# Patient Record
Sex: FEMALE | Race: BLACK OR AFRICAN AMERICAN | Employment: OTHER | URBAN - METROPOLITAN AREA
[De-identification: names, ages, dates, MRNs, and addresses within clinical notes are randomized per-mention and may not be internally consistent; named-entity substitution may affect disease eponyms.]

---

## 2018-06-21 ENCOUNTER — OFFICE VISIT (OUTPATIENT)
Dept: FAMILY MEDICINE CLINIC | Age: 53
End: 2018-06-21

## 2018-06-21 VITALS
OXYGEN SATURATION: 96 % | TEMPERATURE: 98.5 F | WEIGHT: 196.2 LBS | SYSTOLIC BLOOD PRESSURE: 140 MMHG | HEIGHT: 66 IN | RESPIRATION RATE: 12 BRPM | BODY MASS INDEX: 31.53 KG/M2 | DIASTOLIC BLOOD PRESSURE: 98 MMHG | HEART RATE: 83 BPM

## 2018-06-21 DIAGNOSIS — R10.2 PELVIC PAIN: ICD-10-CM

## 2018-06-21 DIAGNOSIS — D25.9 UTERINE LEIOMYOMA, UNSPECIFIED LOCATION: Primary | ICD-10-CM

## 2018-06-21 DIAGNOSIS — R03.0 ELEVATED BLOOD PRESSURE READING: ICD-10-CM

## 2018-06-21 DIAGNOSIS — Z86.000 HISTORY OF DUCTAL CARCINOMA IN SITU (DCIS) OF BREAST: ICD-10-CM

## 2018-06-21 PROBLEM — E21.3 HYPERPARATHYROIDISM (HCC): Status: ACTIVE | Noted: 2018-06-21

## 2018-06-21 NOTE — PATIENT INSTRUCTIONS

## 2018-06-21 NOTE — PROGRESS NOTES
Patient: Jaiden Meyer MRN: [id number]  SSN: [id number]    YOB: 1965  Age: 47 y.o. Sex: female      Date of Service: 6/21/2018   Provider: GURMEET Quintero   Office Location:   [address] [place] [address] Dr Kevni marin, [address]  Office Phone: [phone]  Office Fax: [phone]        REASON FOR VISIT:   Chief Complaint   Patient presents with    Establish Care    Migraine    GERD    Other     Breast Cancer    Other     Uterine Fibroids        VITALS:   Visit Vitals    BP (!) 140/98    Pulse 83    Temp 98.5 °F (36.9 °C) (Oral)    Resp 12    Ht 5' 6\" (1.676 m)  Comment: patient reported    Wt 196 lb 3.2 oz (89 kg)    LMP 06/17/2018    SpO2 96%    BMI 31.67 kg/m2       MEDICATIONS:   Current Outpatient Prescriptions   Medication Sig Dispense Refill    loratadine (CLARITIN) 10 mg tablet Take 1 tablet by mouth daily. 90 tablet 3    esomeprazole (NEXIUM) 40 mg capsule Take 1 capsule by mouth daily.  90 capsule 1        ALLERGIES:   No Known Allergies     MEDICAL/SURGICAL HISTORY:  Past Medical History:   Diagnosis Date    Allergic rhinitis     Contact dermatitis and other eczema, due to unspecified cause     DCIS (ductal carcinoma in situ) of breast 12/06    Fibroids     Fibroids, uterine     Hematuria 12/2013    saw Dr. Chao Lux Lung nodule     Microscopic hematuria     Migraines     Prediabetes     Recurrent UTI 12/2013    saw Dr. Mumtaz Otero, macrodantin per notes as needed    TIA (transient ischemic attack) 2012      Past Surgical History:   Procedure Laterality Date    HX BREAST RECONSTRUCTION  1/11    Bilateral    HX MASTECTOMY  3/09    Bilateral    MYOMECTOMY 1-4,W/TOT 250GMS/<,ABD APPRCH  8/05        FAMILY HISTORY:  Family History   Problem Relation Age of Onset    No Known Problems Mother     Kidney Disease Father         SOCIAL HISTORY:  Social History   Substance Use Topics    Smoking status: Never Smoker    Smokeless tobacco: Never Used    Alcohol use No            HISTORY OF PRESENT ILLNESS:   Cathi Barnett is a 47 y.o. female who presents to the office to re-establish care. She was last seen in this office in 2014 by Dr. Bibi Russell. She has been following with a PCP at the [place] in the meantime, and will continue to receive the bulk of her primary care there. Breast Cancer History -   Patient reports she was diagnosed with DCIS of the R breast in 2007. She underwent 2 lumpectomies and ultimately a b/l mastectomy in 2008. She has since been following with the breast clinic at [facility] (Dr. Little Luna and Dr. Manju Nguyen) but has not been seen since 2016. She is requesting a referral to return for surveillance. She reports she no longer needs mammograms, but they do serial exams and sometimes ultrasounds. Denies any acute issues. Hyperparathyroidism -   Incidentally noted on some routine labs done by PCP at the [place]. She is now followed annually by endocrinology to have her PTH levels checked. She reports everything has been stable, and that surgery will be a last resort. She had a DEXA scan within the past few months which she believes was normal.    History of TIA -   Noted in patient's record. She reports she'd had some issues with slurred speech and facial drooping and it was initially thought to be a stroke but MRI was negative. She forgets exactly what the diagnosis was but will check her records. She has had no recurrent or residual symptoms. BP is noted to be high today on intake     Pelvic Pain -   Patient's main reason for visit today is for a referral to ob/gyn. She has known fibroids in her uterus and is s/p myomectomy in 2005. She reports the fibroids have returned and are causing her some pelvic discomfort, especially deep dyspareunia with intercourse. She saw an ob/gyn at the [place] and was told that she could not have surgery as she was not post-menopausal. She is requesting a second opinion.        Health Maintenance - Previous PCP - Had previously been seeing Dr. Evelyn Guzman and Dr. Tasha Victoria in this office, now predominantly followed by DANI Whitney at the [place]   Last PE with routine labs - last routine exam at the [place] was a few months ago. Reports labs were normal.   Last pap smear - 11/2015, normal per patient, states she will be due this November. Paps have always been normal  Last mammogram - N/A due to b/l mastectomy see above   Last DEXA scan - last month, normal per patient   Last colonoscopy - never. Has FIT test kit at home ordered by her [place] provider. She intends to do this in the next month. Immunizations - UTD per patient, but appears to be due for Tdap booster     REVIEW OF SYSTEMS:  ROS (see scanned H&P form for complete 12 point ROS)     PHYSICAL EXAMINATION:  Physical Exam   Constitutional: She is oriented to person, place, and time and well-developed, well-nourished, and in no distress. Cardiovascular: Normal rate, regular rhythm and normal heart sounds. Exam reveals no gallop and no friction rub. No murmur heard. Pulmonary/Chest: Effort normal and breath sounds normal. She has no wheezes. She has no rales. Neurological: She is alert and oriented to person, place, and time. Gait normal.   Skin: Skin is warm and dry. No rash noted. Psychiatric: Mood, memory and affect normal.        RESULTS:  No results found for this visit on 06/21/18. ASSESSMENT/PLAN:  Diagnoses and all orders for this visit:    1. Uterine leiomyoma, unspecified location   2. Pelvic pain  - Patient requesting second ob/gyn opinion for possible hysterectomy. Referral ordered today. - Advised her to obtain a copy of her most recent pelvic ultrasound report from the [place] to take to that appointment  Orders:    -     [facility]    3.  History of ductal carcinoma in situ (DCIS) of breast  - Will refer back to the [facility] breast clinic for continued surveillance  Orders:    -     REFERRAL TO BREAST SURGERY    4. Elevated blood pressure reading  - Single isolated reading. Patient reports no prior history of HTN and hast not had high blood pressure at other recent doctor's appts. - Encouraged her to obtain a home BP monitor and check periodically with goal being <140/90  - Encouraged low sodium diet, regular exercise  - Will bring her back in a few months for reassessment and start meds if still elevated     5. BMI 31.0-31.9,adult  - Healthy lifestyle handout included in AVS   - DASH diet for high BP         Follow-up Disposition:  Return in about 3 months (around 9/21/2018) for Routine Care.        PATIENT CARE TEAM:   Patient Care Team:  Kiersten Rodrigues MD as PCP - General (Family Practice)  Juli [place] MD (Urology)  Stevenson Maguire MD (Gynecology)       [place], [place]   June 21, 2018    10:36 AM

## 2018-06-21 NOTE — MR AVS SNAPSHOT
[address] [place] [address] 
[phone] Patient: Wilmer Browning MRN: [id number] :1965 Visit Information Date & Time Provider Department Dept. Phone Encounter #  
 2018  9:00 AM Rachana Mathis, [address] [id number] Follow-up Instructions Return in about 3 months (around 2018) for Routine Care. Upcoming Health Maintenance Date Due Pneumococcal 19-64 Highest Risk (1 of 3 - PCV13) 10/6/1985 DTaP/Tdap/Td series (1 - Tdap) 2005 PAP AKA CERVICAL CYTOLOGY 2/10/2015 FOBT Q 1 YEAR AGE 50-75 10/6/2016 Influenza Age 5 to Adult 2018 Allergies as of 2018  Review Complete On: 2018 By: GURMEET Arvizu No Known Allergies Current Immunizations  Reviewed on 3/21/2012 Name Date  
 TD Vaccine 2005 Not reviewed this visit You Were Diagnosed With   
  
 Codes Comments Uterine leiomyoma, unspecified location    -  Primary ICD-10-CM: D25.9 ICD-9-CM: 218.9 Pelvic pain     ICD-10-CM: R10.2 ICD-9-CM: [id number] History of ductal carcinoma in situ (DCIS) of breast     ICD-10-CM: Z86.000 ICD-9-CM: V13.89 Elevated blood pressure reading     ICD-10-CM: R03.0 ICD-9-CM: 796.2 Vitals BP Pulse Temp Resp Height(growth percentile) Weight(growth percentile) (!) 140/98 83 98.5 °F (36.9 °C) (Oral) 12 5' 6\" (1.676 m) 196 lb 3.2 oz (89 kg) LMP SpO2 BMI OB Status Smoking Status 2018 96% 31.67 kg/m2 Premenopausal Never Smoker Vitals History BMI and BSA Data Body Mass Index Body Surface Area  
 31.67 kg/m 2 2.04 m 2 Your Updated Medication List  
  
   
This list is accurate as of 18 10:15 AM.  Always use your most recent med list.  
  
  
  
  
 esomeprazole 40 mg capsule Commonly known as:  NexIUM Take 1 capsule by mouth daily. loratadine 10 mg tablet Commonly known as:  Mery Punches Take 1 tablet by mouth daily. We Performed the Following REFERRAL TO BREAST SURGERY [REF10 Custom] REFERRAL TO OBSTETRICS AND GYNECOLOGY [REF51 Custom] Follow-up Instructions Return in about 3 months (around 9/21/2018) for Routine Care. Referral Information Referral ID Referred By Referred To  
  
 [id number] Ines,  [address] Ivanna Frank [place], [facility] [address] Phone: [phone] Fax: [phone] Visits Status Start Date End Date 1 New Request 6/21/18 6/21/19 If your referral has a status of pending review or denied, additional information will be sent to support the outcome of this decision. Referral ID Referred By Referred To  
 [id number] Tawanna HAMILTON Not Available Visits Status Start Date End Date 1 New Request 6/21/18 6/21/19 If your referral has a status of pending review or denied, additional information will be sent to support the outcome of this decision. Patient Instructions A Healthy Lifestyle: Care Instructions Your Care Instructions A healthy lifestyle can help you feel good, stay at a healthy weight, and have plenty of energy for both work and play. A healthy lifestyle is something you can share with your whole family. A healthy lifestyle also can lower your risk for serious health problems, such as high blood pressure, heart disease, and diabetes. You can follow a few steps listed below to improve your health and the health of your family. Follow-up care is a key part of your treatment and safety. Be sure to make and go to all appointments, and call your doctor if you are having problems. It's also a good idea to know your test results and keep a list of the medicines you take. How can you care for yourself at home? · Do not eat too much sugar, fat, or fast foods.  You can still have dessert and treats now and then. The goal is moderation. · Start small to improve your eating habits. Pay attention to portion sizes, drink less juice and soda pop, and eat more fruits and vegetables. ¨ Eat a healthy amount of food. A 3-ounce serving of meat, for example, is about the size of a deck of cards. Fill the rest of your plate with vegetables and whole grains. ¨ Limit the amount of soda and sports drinks you have every day. Drink more water when you are thirsty. ¨ Eat at least 5 servings of fruits and vegetables every day. It may seem like a lot, but it is not hard to reach this goal. A serving or helping is 1 piece of fruit, 1 cup of vegetables, or 2 cups of leafy, raw vegetables. Have an apple or some carrot sticks as an afternoon snack instead of a candy bar. Try to have fruits and/or vegetables at every meal. 
· Make exercise part of your daily routine. You may want to start with simple activities, such as walking, bicycling, or slow swimming. Try to be active 30 to 60 minutes every day. You do not need to do all 30 to 60 minutes all at once. For example, you can exercise 3 times a day for 10 or 20 minutes. Moderate exercise is safe for most people, but it is always a good idea to talk to your doctor before starting an exercise program. 
· Keep moving. Scotty Charlene the lawn, work in the garden, or Sunible. Take the stairs instead of the elevator at work. · If you smoke, quit. People who smoke have an increased risk for heart attack, stroke, cancer, and other lung illnesses. Quitting is hard, but there are ways to boost your chance of quitting tobacco for good. ¨ Use nicotine gum, patches, or lozenges. ¨ Ask your doctor about stop-smoking programs and medicines. ¨ Keep trying.  
In addition to reducing your risk of diseases in the future, you will notice some benefits soon after you stop using tobacco. If you have shortness of breath or asthma symptoms, they will likely get better within a few weeks after you quit. · Limit how much alcohol you drink. Moderate amounts of alcohol (up to 2 drinks a day for men, 1 drink a day for women) are okay. But drinking too much can lead to liver problems, high blood pressure, and other health problems. Family health If you have a family, there are many things you can do together to improve your health. · Eat meals together as a family as often as possible. · Eat healthy foods. This includes fruits, vegetables, lean meats and dairy, and whole grains. · Include your family in your fitness plan. Most people think of activities such as jogging or tennis as the way to fitness, but there are many ways you and your family can be more active. Anything that makes you breathe hard and gets your heart pumping is exercise. Here are some tips: 
¨ Walk to do errands or to take your child to school or the bus. ¨ Go for a family bike ride after dinner instead of watching TV. Where can you learn more? Go to http://anahy-amy.info/. Enter D240 in the search box to learn more about \"A Healthy Lifestyle: Care Instructions. \" Current as of: May 12, 2017 Content Version: 11.4 © 4249-0616 Peela. Care instructions adapted under license by [employer] (which disclaims liability or warranty for this information). If you have questions about a medical condition or this instruction, always ask your healthcare professional. [place] [address] any warranty or liability for your use of this information. Introducing [facility]! Dear Ruby Monreal: Thank you for requesting a Qapital account. Our records indicate that you already have an active Qapital account. You can access your account anytime at [email or web address] Did you know that you can access your hospital and ER discharge instructions at any time in Qapital?   You can also review all of your test results from your hospital stay or ER visit. Additional Information If you have questions, please visit the Frequently Asked Questions section of the NEX[employer]ID website at [email or web address] Remember, [employer] is NOT to be used for urgent needs. For medical emergencies, dial 911. Now available from your iPhone and Android! Please provide this summary of care documentation to your next provider. Your primary care clinician is listed as Cody Hernández. If you have any questions after today's visit, please call [phone].

## 2018-06-21 NOTE — PROGRESS NOTES
Chief Complaint   Patient presents with    Establish Care    Migraine    GERD    Other     Breast Cancer    Other     Uterine Fibroids     Visit Vitals    /86 (BP 1 Location: Left arm, BP Patient Position: Sitting)    Pulse 83    Temp 98.5 °F (36.9 °C) (Oral)    Resp 12    Ht 5' 6\" (1.676 m)    Wt 196 lb 3.2 oz (89 kg)    SpO2 96%    BMI 31.67 kg/m2     Patient is not fasting. Patient in room # 5.     1. Have you been to the ER, urgent care clinic since your last visit? Hospitalized since your last visit? No    2. Have you seen or consulted any other health care providers outside of the [address] [place] [address] since your last visit? Include any pap smears or colon screening. Yes When: 02/2018 Where: [facility] Reason for visit: 6 month check-up, 3/2018, [facility], Hyperparathyroid.  Reviewed. Pap in 2016 at [facility]. Mammogram patient declines. FIt test due, ordered by [facility] patient to completed by 07/2018. Flowsheet, Abuse, Learning needs and PHQ completed.

## 2018-07-25 ENCOUNTER — TELEPHONE (OUTPATIENT)
Dept: FAMILY MEDICINE CLINIC | Age: 53
End: 2018-07-25

## 2018-07-25 NOTE — TELEPHONE ENCOUNTER
Called home number. Verified name and . Spoke with patient. Patient notified of following up on referrals by Juan Antonio Draper and would like to know if she had appointment. Notified of notice from insurance of sending her to [facility]. Per patient has not been able to see providers as she has been in and out town, ongoing. Per patient she has the information from [employer] as well for [facility]. Per patient she will contact them today as planned to schedule appointments. Per patient had a visit to [facility] for  6 month follow-up with Carey Liao NP and blood pressure was fine at that visit. Patient was notified to contact office if she needs anything or have any further questions or concerns. Patient understood the reason for call and had no further questions.

## 2019-05-01 ENCOUNTER — HOSPITAL ENCOUNTER (OUTPATIENT)
Dept: PHYSICAL THERAPY | Age: 54
Discharge: HOME | End: 2019-05-01
Payer: COMMERCIAL

## 2019-05-01 PROCEDURE — 97162 PT EVAL MOD COMPLEX 30 MIN: CPT | Performed by: PHYSICAL THERAPIST

## 2019-05-01 PROCEDURE — 97112 NEUROMUSCULAR REEDUCATION: CPT | Performed by: PHYSICAL THERAPIST

## 2019-05-01 PROCEDURE — 97140 MANUAL THERAPY 1/> REGIONS: CPT | Performed by: PHYSICAL THERAPIST

## 2019-05-01 NOTE — PROGRESS NOTES
PT DAILY TREATMENT NOTE 10-18 Patient Name: Adrien Barraza Date:2019 : 1965 [x]  Patient  Verified Payor: Morris Dean / Plan: Kane Santos / Product Type: JibJab / In time:800  Out time:843 Total Treatment Time (min): 43 Visit #: 1 of 12 Medicare/BCBS Only Total Timed Codes (min):   1:1 Treatment Time:    
 
 
Treatment Area: Low back pain [M54.5] SUBJECTIVE Pain Level (0-10 scale): 5/10 Any medication changes, allergies to medications, adverse drug reactions, diagnosis change, or new procedure performed?: [x] No    [] Yes (see summary sheet for update) Subjective functional status/changes:   [] No changes reported HPI: Pt is a 46 y/o female w/ c/o increased pain in the center of the low back w/ a grinding sensation that has been on and off since  w/ recent flare up within the last year. Pt denies known EM for pain but reports it began after having her daughter. States she notices that the pain worsens when she has a flare up of her fibroids and with heavier activity as well as prolonged standing. Pain improved w/ a medication regimen that included motrin and methocarbamol as well as sitting in specific positions. Pt reports she has not had recent imaging but that she remembers she was told she has degeneration and arthritis in her back. Pt is retired and denies n/t into her legs. OBJECTIVE Modality rationale:  to improve the patients ability to Min Type Additional Details  
 [] Estim:  []Unatt       []IFC  []Premod []Other:  []w/ice   []w/heat Position: Location:  
 [] Estim: []Att    []TENS instruct  []NMES []Other:  []w/US   []w/ice   []w/heat Position: Location:  
 []  Traction: [] Cervical       []Lumbar 
                     [] Prone          []Supine []Intermittent   []Continuous Lbs: 
[] before manual 
[] after manual  
 []  Ultrasound: []Continuous   [] Pulsed []1MHz   []3MHz W/cm2: 
Location:  
 []  Iontophoresis with dexamethasone Location: [] Take home patch  
[] In clinic  
 []  Ice     []  heat 
[]  Ice massage 
[]  Laser  
[]  Anodyne Position: Location:  
 []  Laser with stim 
[]  Other:  Position: Location:  
 []  Vasopneumatic Device Pressure:       [] lo [] med [] hi  
Temperature: [] lo [] med [] hi  
[] Skin assessment post-treatment:  []intact []redness- no adverse reaction 
  []redness  adverse reaction:  
 
10 min [x]Eval                  []Re-Eval  
 
 
 min Therapeutic Exercise:  [] See flow sheet :  
Rationale:  to improve the patients ability to  
 
 min Therapeutic Activity:  []  See flow sheet :  
Rationale:   to improve the patients ability to  
  
23 min Neuromuscular Re-education:  [x]  See flow sheet : instructed in, demo'd, and performed HEP, educated in mechanics, core activation and components, and goals/progression of PT Rationale: increase strength, improve coordination and increase proprioception  to improve the patients ability to decrease pain and improve activity tolerance 10 min Manual Therapy:  MET for left post rotation, shotgun to set, TPR/DTM to left piriformis and B hip flexor stretch in prone Rationale: decrease pain, increase ROM, increase tissue extensibility and decrease trigger points to improve activity tolerance and function 
 
 min Gait Training:  ___ feet with ___ device on level surfaces with ___ level of assist  
Rationale: With 
 [] TE 
 [] TA 
 [] neuro 
 [] other: Patient Education: [x] Review HEP [] Progressed/Changed HEP based on:  
[] positioning   [] body mechanics   [] transfers   [] heat/ice application   
[] other:   
 
Other Objective/Functional Measures: Pt presents w/ l/s AROM WNL w/ slightly decreased lumbar reversal into FF, reports of pulling in opposite side w/ B AB.  MMT hip flex right 5/5, left 4/5, abd right 4+/5, left 4/5, ext right 4/5, left -4/5, knee flex B 5/5, ext B 5/5. Core strength measured w/ supine bridge is -3/5. Noted left posterior rotation at the innominate that corrected w/ MET. Increased myofascial restriction noted in the lumbar, glute, and lower abdominal regions. (-) neural tension tests, pain reported to decrease w/ B LE distraction, (-) flexibility testing. No TTP reported. Pain Level (0-10 scale) post treatment: 4/10 ASSESSMENT/Changes in Function: Pt w/ good response to manual therapy and improved pain following session. Educated in depth the components that make up the core and correct performance of kegel along w/ PPT to fully activate the core. Pt will benefit from skilled PT to address the deficits and progress with pt goals. Patient will continue to benefit from skilled PT services to modify and progress therapeutic interventions, address functional mobility deficits, address strength deficits, analyze and address soft tissue restrictions, analyze and cue movement patterns, analyze and modify body mechanics/ergonomics and instruct in home and community integration to attain remaining goals. [x]  See Plan of Care 
[]  See progress note/recertification 
[]  See Discharge Summary Progress towards goals / Updated goals: 
Short Term Goals: To be accomplished in 2 weeks: 1. Pt will be (I) and complaint w/ HEP to progress gains in PT.  
  
Long Term Goals: To be accomplished in 6 weeks: 1. Pt will improve FOTO to > or = to 66 to demo improved function. 2. Pt will improve core strength to > or = to 4/5 for ease w/ prolonged standing to cook dinner. 3. Pt will left hip MMT to > or = to 4+/5 for ease w/ performing heavier activity around her house. 4. Pt will report > or = to 60% overall improvement in symptoms for ease w/ return to unlimited ADLs. PLAN [x]  Upgrade activities as tolerated     [x]  Continue plan of care 
[]  Update interventions per flow sheet []  Discharge due to:_ 
[]  Other:_   
 
Jaspal Shukla, PT 5/1/2019  8:53 AM 
 
Future Appointments Date Time Provider Eliana Christian 5/7/2019  3:00 PM Patirajwinder Mastrenton B, PT [facility]  
5/8/2019  8:30 AM Jenni White, PT [facility]  
5/14/2019 10:30 AM Patience Mash B, PT [facility]  
5/16/2019 10:30 AM Patience Mash B, PT [facility]  
5/20/2019 10:30 AM Patience Mash B, PT [facility]  
5/21/2019 10:30 AM Patience Mash B, PT [facility]  
5/28/2019 10:30 AM Patience Mash B, PT [facility]  
5/30/2019 10:00 AM Patience Mash B, PT [facility]  
6/3/2019  2:30 PM Jenni White, PT [facility]  
6/5/2019 10:00 AM Jenni White, PT [facility]  
6/11/2019 10:30 AM Regino Hathaway, PT [facility]  
7/16/2019  8:00 AM Ramiro Ventura, GURMEET RODRIGUEZ SCHED

## 2019-05-01 NOTE — PROGRESS NOTES
In [address]  Fili Polk [address] [place], [address] 
[phone] fax Plan of Care/ Statement of Necessity for Physical Therapy Services Patient name: Tootie Delatorre Start of Care: 2019 Referral source: Tay Guadalupe NP : 1965 Medical Diagnosis: Low back pain [M54.5] Payor: [place] / Plan: [employer] / Product Type: [employer] Programs /  Onset RFLY:[id number] w/ flare up in the last year Treatment Diagnosis: LBP w/ pelvic obliquities Prior Hospitalization: see medical history Provider#: [id number] Medications: Verified on Patient summary List  
 Comorbidities: allergies, back pain, BMI > 30, CA, osteoporosis, prior surgery Prior Level of Function: Hx of intermittent back pain limiting heavy household activity and prolonged standing, retired The Plan of Care and following information is based on the information from the initial evaluation. Assessment/ key information: Pt is a 46 y/o female w/ c/o increased pain in the center of the low back w/ a grinding sensation that has been on and off since  w/ recent flare up within the last year. Pt denies known EM for pain but reports it began after having her daughter. States she notices that the pain worsens when she has a flare up of her fibroids and with heavier activity as well as prolonged standing. Pain improved w/ a medication regimen that included motrin and methocarbamol as well as sitting in specific positions. Pt reports she has not had recent imaging but that she remembers she was told she has degeneration and arthritis in her back. Pt is retired and denies n/t into her legs. Pt presents w/ l/s AROM WNL w/ slightly decreased lumbar reversal into FF, reports of pulling in opposite side w/ B AB. MMT hip flex right 5/5, left 4/5, abd right 4+/5, left 4/5, ext right 4/5, left -4/5, knee flex B 5/5, ext B 5/5.  Core strength measured w/ supine bridge is -3/5. Noted left posterior rotation at the innominate that corrected w/ MET. Increased myofascial restriction noted in the lumbar, glute, and lower abdominal regions. (-) neural tension tests, pain reported to decrease w/ B LE distraction, (-) flexibility testing. No TTP reported. Patient will benefit from skilled PT services to modify and progress therapeutic interventions, address functional mobility deficits, address strength deficits, analyze and address soft tissue restrictions, analyze and cue movement patterns, analyze and modify body mechanics/ergonomics and instruct in home and community integration to attain remaining goals. Evaluation Complexity History MEDIUM  Complexity : 1-2 comorbidities / personal factors will impact the outcome/ POC ; Examination MEDIUM Complexity : 3 Standardized tests and measures addressing body structure, function, activity limitation and / or participation in recreation  ;Presentation MEDIUM Complexity : Evolving with changing characteristics  ; Clinical Decision Making MEDIUM Complexity : FOTO score of 26-74 Overall Complexity Rating: MEDIUM Problem List: pain affecting function, decrease strength, decrease ADL/ functional abilitiies and decrease activity tolerance Treatment Plan may include any combination of the following: Therapeutic exercise, Therapeutic activities, Neuromuscular re-education, Physical agent/modality, Manual therapy and Patient education Patient / Family readiness to learn indicated by: asking questions, trying to perform skills and interest 
Persons(s) to be included in education: patient (P) Barriers to Learning/Limitations: None Patient Goal (s): I hope to accomplish a pain free life or a way to manage outside of physical therapy Patient Self Reported Health Status: good Rehabilitation Potential: good Short Term Goals: To be accomplished in 2 weeks: 1. Pt will be (I) and complaint w/ HEP to progress gains in PT. Long Term Goals: To be accomplished in 6 weeks: 1. Pt will improve FOTO to > or = to 66 to demo improved function. 2. Pt will improve core strength to > or = to 4/5 for ease w/ prolonged standing to cook dinner. 3. Pt will left hip MMT to > or = to 4+/5 for ease w/ performing heavier activity around her house. 4. Pt will report > or = to 60% overall improvement in symptoms for ease w/ return to unlimited ADLs. Frequency / Duration: Patient to be seen 2 times per week for 6 weeks. Patient/ Caregiver education and instruction: Diagnosis, prognosis, self care, activity modification and exercises 
 [x]  Plan of care has been reviewed with KRISTI Eisenberg, PT 5/1/2019 12:42 PM 
 
________________________________________________________________________ I certify that the above Therapy Services are being furnished while the patient is under my care. I agree with the treatment plan and certify that this therapy is necessary. [de-identified] Signature:____________Date:_________TIME:________ 
 
[employer], Date and Time must be completed for valid certification ** Please sign and return to In [address] [place], [address] 
[phone] fax

## 2019-05-07 ENCOUNTER — HOSPITAL ENCOUNTER (OUTPATIENT)
Dept: PHYSICAL THERAPY | Age: 54
Discharge: HOME | End: 2019-05-07
Payer: COMMERCIAL

## 2019-05-07 PROCEDURE — 97140 MANUAL THERAPY 1/> REGIONS: CPT

## 2019-05-07 PROCEDURE — 97110 THERAPEUTIC EXERCISES: CPT

## 2019-05-07 PROCEDURE — 97112 NEUROMUSCULAR REEDUCATION: CPT

## 2019-05-07 NOTE — PROGRESS NOTES
PT DAILY TREATMENT NOTE 10-18 Patient Name: Adrien Barraza Date:2019 : 1965 [x]  Patient  Verified Payor: Morris Dean / Plan: Kane Santos / Product Type: [employer] / In time:300  Out time:349 Total Treatment Time (min): 49 Visit #: 2 of 12 Medicare/BCBS Only Total Timed Codes (min):   1:1 Treatment Time:    
 
 
Treatment Area: Low back pain [M54.5] SUBJECTIVE Pain Level (0-10 scale): 5 Any medication changes, allergies to medications, adverse drug reactions, diagnosis change, or new procedure performed?: [x] No    [] Yes (see summary sheet for update) Subjective functional status/changes:   [] No changes reported I felt good after I left last time. I actually could do more things at home. OBJECTIVE Modality rationale: decrease pain to improve the patients ability to tolerate post needle soreness Min Type Additional Details  
 [] Estim:  []Unatt       []IFC  []Premod []Other:  []w/ice   []w/heat Position: Location:  
 [] Estim: []Att    []TENS instruct  []NMES []Other:  []w/US   []w/ice   []w/heat Position: Location:  
 []  Traction: [] Cervical       []Lumbar 
                     [] Prone          []Supine []Intermittent   []Continuous Lbs: 
[] before manual 
[] after manual  
 []  Ultrasound: []Continuous   [] Pulsed []1MHz   []3MHz W/cm2: 
Location:  
 []  Iontophoresis with dexamethasone Location: [] Take home patch  
[] In clinic  
10 []  Ice     [x]  heat 
[]  Ice massage 
[]  Laser  
[]  Anodyne Position:supine Location:L/S  
 []  Laser with stim 
[]  Other:  Position: Location:  
 []  Vasopneumatic Device Pressure:       [] lo [] med [] hi  
Temperature: [] lo [] med [] hi  
[] Skin assessment post-treatment:  []intact []redness- no adverse reaction 
  []redness  adverse reaction: 21 min Therapeutic Exercise:  [] See flow sheet :  
Rationale: increase ROM and increase strength to improve the patients ability to perform ADLs 8 min Neuromuscular Re-education:  []  See flow sheet :  
Rationale: increase strength  to improve the patients ability to recruit TA for daily activities 10 min Manual Therapy:  Per flow sheet Rationale: decrease pain, increase ROM and increase tissue extensibility to improve glute recruitment With 
 [] TE 
 [] TA 
 [] neuro 
 [] other: Patient Education: [x] Review HEP [] Progressed/Changed HEP based on:  
[] positioning   [] body mechanics   [] transfers   [] heat/ice application   
[] other:   
 
Other Objective/Functional Measures:   
 
Pain Level (0-10 scale) post treatment: 4 
 
ASSESSMENT/Changes in Function: Poor spinal mobility noted; added segmental bridging and typewriter next visit. Patient will continue to benefit from skilled PT services to modify and progress therapeutic interventions, address functional mobility deficits, address ROM deficits, address strength deficits, analyze and address soft tissue restrictions, analyze and cue movement patterns and assess and modify postural abnormalities to attain remaining goals. []  See Plan of Care 
[]  See progress note/recertification 
[]  See Discharge Summary Progress towards goals / Updated goals: 
  
Short Term Goals: To be accomplished in 2 weeks: 1. Pt will be (I) and complaint w/ HEP to progress gains in PT. goal met5/7/19 
  
Long Term Goals: To be accomplished in 6 weeks: 1. Pt will improve FOTO to > or = to 66 to demo improved function. 2. Pt will improve core strength to > or = to 4/5 for ease w/ prolonged standing to cook dinner. 3. Pt will left hip MMT to > or = to 4+/5 for ease w/ performing heavier activity around her house. 4. Pt will report > or = to 60% overall improvement in symptoms for ease w/ return to unlimited ADLs.   
 
 
 
PLAN 
 []  Upgrade activities as tolerated     [x]  Continue plan of care 
[]  Update interventions per flow sheet      
[]  Discharge due to:_ 
[]  Other:_ Mercedes Luz, PT 5/7/2019  3:52 PM 
 
Future Appointments Date Time Provider Eliana Christian 5/8/2019  8:30 AM Fritz July, PT [facility]  
5/14/2019 10:30 AM Faith Legions B, PT [facility]  
5/16/2019 10:30 AM Faith Legions B, PT [facility]  
5/20/2019 10:30 AM Faith Legions B, PT [facility]  
5/21/2019 10:30 AM Faith Legions B, PT [facility]  
5/28/2019 10:30 AM Faith Legions B, PT [facility]  
5/30/2019 10:00 AM Fatih Legions B, PT [facility]  
6/3/2019  2:30 PM Fritz July, PT [facility]  
6/5/2019 10:00 AM Fritz July, PT [facility]  
6/11/2019 10:30 AM Urban Bologna, PT [facility]  
7/16/2019  8:00 AM Zak Ventura, PA [facility] SCHED

## 2019-05-08 ENCOUNTER — HOSPITAL ENCOUNTER (OUTPATIENT)
Dept: PHYSICAL THERAPY | Age: 54
Discharge: HOME | End: 2019-05-08
Payer: COMMERCIAL

## 2019-05-08 PROCEDURE — 97112 NEUROMUSCULAR REEDUCATION: CPT | Performed by: PHYSICAL THERAPIST

## 2019-05-08 PROCEDURE — 97110 THERAPEUTIC EXERCISES: CPT | Performed by: PHYSICAL THERAPIST

## 2019-05-08 PROCEDURE — 97140 MANUAL THERAPY 1/> REGIONS: CPT | Performed by: PHYSICAL THERAPIST

## 2019-05-08 NOTE — PROGRESS NOTES
PT DAILY TREATMENT NOTE 10-18 Patient Name: Rula Watt Date:2019 : 1965 [x]  Patient  Verified Payor: Ananya Espinoza / Plan: Nathalia Goldstein / Product Type: [employer] / In time:830  Out time:921 Total Treatment Time (min): 51 Visit #: 3 of 12 Medicare/BCBS Only Total Timed Codes (min):   1:1 Treatment Time:    
 
 
Treatment Area: Low back pain [M54.5] SUBJECTIVE Pain Level (0-10 scale): 10 Any medication changes, allergies to medications, adverse drug reactions, diagnosis change, or new procedure performed?: [x] No    [] Yes (see summary sheet for update) Subjective functional status/changes:   [] No changes reported Pt reports she is really sore after last visit but her pain is hardly noticeable OBJECTIVE Modality rationale: decrease pain and increase muscle contraction/control to improve the patients ability to decrease soreness and improve activity tolerance Min Type Additional Details  
 [] Estim:  []Unatt       []IFC  []Premod []Other:  []w/ice   []w/heat Position: Location:  
 [] Estim: []Att    []TENS instruct  []NMES []Other:  []w/US   []w/ice   []w/heat Position: Location:  
 []  Traction: [] Cervical       []Lumbar 
                     [] Prone          []Supine []Intermittent   []Continuous Lbs: 
[] before manual 
[] after manual  
 []  Ultrasound: []Continuous   [] Pulsed []1MHz   []3MHz W/cm2: 
Location:  
 []  Iontophoresis with dexamethasone Location: [] Take home patch  
[] In clinic  
10 []  Ice     [x]  heat 
[]  Ice massage 
[]  Laser  
[]  Anodyne Position: supine Location: l/s  
 []  Laser with stim 
[]  Other:  Position: Location:  
 []  Vasopneumatic Device Pressure:       [] lo [] med [] hi  
Temperature: [] lo [] med [] hi  
[] Skin assessment post-treatment:  []intact []redness- no adverse reaction []redness  adverse reaction:  
 
 min []Eval                  []Re-Eval  
 
 
26 min Therapeutic Exercise:  [x] See flow sheet : progressed per flow sheet Rationale: increase ROM, increase strength and improve coordination to improve the patients ability to decrease pain and improve activity tolerance 
 
 min Therapeutic Activity:  []  See flow sheet :  
Rationale:   to improve the patients ability to  
  
15 min Neuromuscular Re-education:  [x]  See flow sheet : progressed core stabilization and activation ex's per flow sheet Rationale: increase strength, improve coordination, improve balance and increase proprioception  to improve the patients ability to improve standing tolerance and pain 10 min Manual Therapy:  TPR/STM to right upper glutes, piriformis, and B paraspinals, manual hip flex str Rationale: decrease pain, increase ROM, increase tissue extensibility and decrease trigger points to improve mobility and activity tolerance  
 
 min Gait Training:  ___ feet with ___ device on level surfaces with ___ level of assist  
Rationale: With 
 [] TE 
 [] TA 
 [] neuro 
 [] other: Patient Education: [x] Review HEP [] Progressed/Changed HEP based on:  
[] positioning   [] body mechanics   [] transfers   [] heat/ice application   
[] other:   
 
Other Objective/Functional Measures:   
 
Pain Level (0-10 scale) post treatment: 0/10 ASSESSMENT/Changes in Function: Mod vc's for core and pelvic floor activation. Educated on purpose of ex's during treatment to assist pt in understanding reason for completion. Educated on DOMS.   
 
Patient will continue to benefit from skilled PT services to modify and progress therapeutic interventions, address functional mobility deficits, address ROM deficits, address strength deficits, analyze and address soft tissue restrictions, analyze and cue movement patterns, analyze and modify body mechanics/ergonomics, assess and modify postural abnormalities and instruct in home and community integration to attain remaining goals. []  See Plan of Care 
[]  See progress note/recertification 
[]  See Discharge Summary Progress towards goals / Updated goals: 
 
Short Term Goals: To be accomplished in 2 weeks: 1. Pt will be (I) and complaint w/ HEP to progress gains in PT. goal met5/7/19 
  
Long Term Goals: To be accomplished in 6 weeks: 1. Pt will improve FOTO to > or = to 66 to demo improved function. 2. Pt will improve core strength to > or = to 4/5 for ease w/ prolonged standing to cook dinner. 3. Pt will left hip MMT to > or = to 4+/5 for ease w/ performing heavier activity around her house. 4. Pt will report > or = to 60% overall improvement in symptoms for ease w/ return to unlimited ADLs.   
  
 
PLAN 
[]  Upgrade activities as tolerated     [x]  Continue plan of care 
[]  Update interventions per flow sheet      
[]  Discharge due to:_ 
[]  Other:_   
 
Lobo Apodaca, PT 5/8/2019  8:58 AM 
 
Future Appointments Date Time Provider Eliana Christian 5/14/2019 10:30 AM Verlin Spare B, PT [facility]  
5/16/2019 10:30 AM Verlin Spare B, PT [facility]  
5/20/2019 10:30 AM Verlin Spare B, PT [facility]  
5/21/2019 10:30 AM Verlin Spare B, PT [facility]  
5/28/2019 10:30 AM Verlin Spare B, PT [facility]  
5/30/2019 10:00 AM Verlin Spare B, PT [facility]  
6/3/2019  2:30 PM Amy Cunningham, PT [facility]  
6/5/2019 10:00 AM Amy Cunningham, PT [facility]  
6/11/2019 10:30 AM Vickey Olszewski, PT [facility]  
7/16/2019  8:00 AM Antoinette Ventura, [facility]

## 2019-05-14 ENCOUNTER — HOSPITAL ENCOUNTER (OUTPATIENT)
Dept: PHYSICAL THERAPY | Age: 54
Discharge: HOME | End: 2019-05-14
Payer: COMMERCIAL

## 2019-05-14 PROCEDURE — 97140 MANUAL THERAPY 1/> REGIONS: CPT

## 2019-05-14 PROCEDURE — 97110 THERAPEUTIC EXERCISES: CPT

## 2019-05-14 PROCEDURE — 97112 NEUROMUSCULAR REEDUCATION: CPT

## 2019-05-14 NOTE — PROGRESS NOTES
PT DAILY TREATMENT NOTE 10-18 Patient Name: Glory Judd Date:2019 : 1965 [x]  Patient  Verified Payor: Montano Law / Plan: [employer] / Product Type: [employer] / In time:1030  Out time:1125 Total Treatment Time (min): 55 Visit #: 4 of 12 Medicare/BCBS Only Total Timed Codes (min):   1:1 Treatment Time:    
 
 
Treatment Area: Low back pain [M54.5] SUBJECTIVE Pain Level (0-10 scale): 3 Any medication changes, allergies to medications, adverse drug reactions, diagnosis change, or new procedure performed?: [x] No    [] Yes (see summary sheet for update) Subjective functional status/changes:   [] No changes reported I'm feeling better. OBJECTIVE Modality rationale: decrease pain to improve the patients ability to To tolerate post exercise soreness Min Type Additional Details  
 [] Estim:  []Unatt       []IFC  []Premod []Other:  []w/ice   []w/heat Position: Location:  
 [] Estim: []Att    []TENS instruct  []NMES []Other:  []w/US   []w/ice   []w/heat Position: Location:  
 []  Traction: [] Cervical       []Lumbar 
                     [] Prone          []Supine []Intermittent   []Continuous Lbs: 
[] before manual 
[] after manual  
 []  Ultrasound: []Continuous   [] Pulsed []1MHz   []3MHz W/cm2: 
Location:  
 []  Iontophoresis with dexamethasone Location: [] Take home patch  
[] In clinic  
10 []  Ice     [x]  heat 
[]  Ice massage 
[]  Laser  
[]  Anodyne Position:seated Location:T/S and l/S  
 []  Laser with stim 
[]  Other:  Position: Location:  
 []  Vasopneumatic Device Pressure:       [] lo [] med [] hi  
Temperature: [] lo [] med [] hi  
[] Skin assessment post-treatment:  []intact []redness- no adverse reaction 27 min Therapeutic Exercise:  [] See flow sheet :  
Rationale: increase ROM and increase strength to improve the patients ability to perform daily activities 10 min Neuromuscular Re-education:  []  See flow sheet :  
Rationale: increase ROM and increase strength  to improve the patients ability to improve spinal segmental mobility 8 min Manual Therapy:  Per flow sheet Rationale: decrease pain, increase ROM and increase tissue extensibility to improve hip mobility for daily activities With 
 [] TE 
 [] TA 
 [] neuro 
 [] other: Patient Education: [x] Review HEP [] Progressed/Changed HEP based on:  
[] positioning   [] body mechanics   [] transfers   [] heat/ice application   
[] other:   
 
Other Objective/Functional Measures: added reformer exercises Pain Level (0-10 scale) post treatment: 0 
 
ASSESSMENT/Changes in Function: Fairly good L/S mobility ; T/S remains limited and hypomobile. Patient will continue to benefit from skilled PT services to modify and progress therapeutic interventions, address functional mobility deficits, address ROM deficits, address strength deficits, analyze and address soft tissue restrictions, analyze and cue movement patterns and assess and modify postural abnormalities to attain remaining goals. []  See Plan of Care 
[]  See progress note/recertification 
[]  See Discharge Summary Progress towards goals / Updated goals: 
Short Term Goals: To be accomplished in 2 weeks: 1. Pt will be (I) and complaint w/ HEP to progress gains in PT. goal met5/7/19 
  
Long Term Goals: To be accomplished in 6 weeks: 1. Pt will improve FOTO to > or = to 67 to demo improved function. 2. Pt will improve core strength to > or = to 4/5 for ease w/ prolonged standing to cook dinner. 3. Pt will left hip MMT to > or = to 4+/5 for ease w/ performing heavier activity around her house. 4. Pt will report > or = to 60% overall improvement in symptoms for ease w/ return to unlimited ADLs.   
  
 
 
 
PLAN 
[]  Upgrade activities as tolerated     []  Continue plan of care []  Update interventions per flow sheet      
[]  Discharge due to:_ 
[]  Other:_ Reji Age, PT 5/14/2019  10:56 AM 
 
Future Appointments Date Time Provider Eliana Christian 5/16/2019 10:30 AM Gely Imam B, PT [facility]  
5/20/2019 10:30 AM Gely Imam B, PT [facility]  
5/21/2019 10:30 AM Gely Imam B, PT [facility]  
5/28/2019 10:30 AM Gely Imam B, PT [facility]  
5/30/2019 10:00 AM Gely Imam B, PT [facility]  
6/3/2019  2:30 PM Dorena Flatness, PT [facility]  
6/5/2019 10:00 AM Dorena Flatness, PT [facility]  
6/11/2019 10:30 AM Kallie Elkins, PT [facility]  
7/16/2019  8:00 AM Lorenzo, [address], [place] [facility]AY[facility] RODRIGUEZ SCHED

## 2019-05-16 ENCOUNTER — HOSPITAL ENCOUNTER (OUTPATIENT)
Dept: PHYSICAL THERAPY | Age: 54
Discharge: HOME | End: 2019-05-16
Payer: COMMERCIAL

## 2019-05-16 PROCEDURE — 97110 THERAPEUTIC EXERCISES: CPT

## 2019-05-16 PROCEDURE — 97140 MANUAL THERAPY 1/> REGIONS: CPT

## 2019-05-16 PROCEDURE — 97112 NEUROMUSCULAR REEDUCATION: CPT

## 2019-05-16 NOTE — PROGRESS NOTES
PT DAILY TREATMENT NOTE 10-18 Patient Name: Estrella Mohamud Date:2019 : 1965 [x]  Patient  Verified Payor: Terry Martinez / Plan: Savannah Verdugo / Product Type: [employer] / In time:1030  Out time:1119 Total Treatment Time (min): 49 Visit #: 5 of 12 Medicare/BCBS Only Total Timed Codes (min):   1:1 Treatment Time:    
 
 
Treatment Area: Low back pain [M54.5] SUBJECTIVE Pain Level (0-10 scale): 3 Any medication changes, allergies to medications, adverse drug reactions, diagnosis change, or new procedure performed?: [x] No    [] Yes (see summary sheet for update) Subjective functional status/changes:   [x] No changes reported OBJECTIVE Modality rationale: decrease pain to improve the patients ability to To tolerate post exercise soreness Min Type Additional Details  
 [] Estim:  []Unatt       []IFC  []Premod []Other:  []w/ice   []w/heat Position: Location:  
 [] Estim: []Att    []TENS instruct  []NMES []Other:  []w/US   []w/ice   []w/heat Position: Location:  
 []  Traction: [] Cervical       []Lumbar 
                     [] Prone          []Supine []Intermittent   []Continuous Lbs: 
[] before manual 
[] after manual  
 []  Ultrasound: []Continuous   [] Pulsed []1MHz   []3MHz W/cm2: 
Location:  
 []  Iontophoresis with dexamethasone Location: [] Take home patch  
[] In clinic  
10 []  Ice     [x]  heat 
[]  Ice massage 
[]  Laser  
[]  Anodyne Position:prone Location:L/S  
 []  Laser with stim 
[]  Other:  Position: Location:  
 []  Vasopneumatic Device Pressure:       [] lo [] med [] hi  
Temperature: [] lo [] med [] hi  
[] Skin assessment post-treatment:  []intact []redness- no adverse reaction 8 min Therapeutic Exercise:  [] See flow sheet :  
Rationale: increase ROM to improve the patients ability to perform ADLs 23 min Neuromuscular Re-education:  []  See flow sheet :  
Rationale: increase ROM and increase strength  to improve the patients ability to perform daily activities 8 min Manual Therapy:  Ant hip mobs Rationale: decrease pain and increase ROM to perform ADLs With 
 [] TE 
 [] TA 
 [] neuro 
 [] other: Patient Education: [x] Review HEP [] Progressed/Changed HEP based on:  
[] positioning   [] body mechanics   [] transfers   [] heat/ice application   
[] other:   
 
Other Objective/Functional Measures: added reformer exercises per flow sheet Pain Level (0-10 scale) post treatment: 0 
 
ASSESSMENT/Changes in Function: Hypermobile hips in some directions; however, rib cage and T/S remains hypomobile. Continue to focus on T/S ext and rib mobility as well as TA and glute strength. Patient will continue to benefit from skilled PT services to modify and progress therapeutic interventions, address functional mobility deficits, address ROM deficits, address strength deficits, analyze and address soft tissue restrictions, analyze and cue movement patterns and assess and modify postural abnormalities to attain remaining goals. []  See Plan of Care 
[]  See progress note/recertification 
[]  See Discharge Summary Progress towards goals / Updated goals: 
Short Term Goals: To be accomplished in 2 weeks: 1. Pt will be (I) and complaint w/ HEP to progress gains in PT. goal met5/7/19 
  
Long Term Goals: To be accomplished in 6 weeks: 1. Pt will improve FOTO to > or = to 66 to demo improved function. 2. Pt will improve core strength to > or = to 4/5 for ease w/ prolonged standing to cook dinner. Not met- cueing to correct rib flare in supine  5/16/19 3. Pt will left hip MMT to > or = to 4+/5 for ease w/ performing heavier activity around her house. 4. Pt will report > or = to 60% overall improvement in symptoms for ease w/ return to unlimited ADLs.   
 
 
 
PLAN 
 []  Upgrade activities as tolerated     [x]  Continue plan of care 
[]  Update interventions per flow sheet      
[]  Discharge due to:_ 
[]  Other:_ Sweta Coleman, PT 5/16/2019  11:00 AM 
 
Future Appointments Date Time Provider Eliana Christian 5/20/2019 10:30 AM Sparkle Lee B, PT [facility]  
5/21/2019 10:30 AM Sparkle Lee B, PT [facility]  
5/28/2019 10:30 AM Sparkle Lee B, PT [facility]  
5/30/2019 10:00 AM Sparkle Lee B, PT [facility]  
6/3/2019  2:30 PM Chandler Mac, PT [facility]  
6/5/2019 10:00 AM Dakota Mac, PT [facility]  
6/11/2019 10:30 AM Cherri Edward, PT [facility]  
7/16/2019  8:00 AM Aria Ventura, PA [facility] SCHED

## 2019-05-20 ENCOUNTER — APPOINTMENT (OUTPATIENT)
Dept: PHYSICAL THERAPY | Age: 54
End: 2019-05-20
Payer: COMMERCIAL

## 2019-05-21 ENCOUNTER — APPOINTMENT (OUTPATIENT)
Dept: PHYSICAL THERAPY | Age: 54
End: 2019-05-21
Payer: COMMERCIAL

## 2019-05-28 ENCOUNTER — HOSPITAL ENCOUNTER (OUTPATIENT)
Dept: PHYSICAL THERAPY | Age: 54
Discharge: HOME | End: 2019-05-28
Payer: COMMERCIAL

## 2019-05-28 PROCEDURE — 97140 MANUAL THERAPY 1/> REGIONS: CPT

## 2019-05-28 PROCEDURE — 97110 THERAPEUTIC EXERCISES: CPT

## 2019-05-28 PROCEDURE — 97112 NEUROMUSCULAR REEDUCATION: CPT

## 2019-05-28 NOTE — PROGRESS NOTES
PT DAILY TREATMENT NOTE 10-18    Patient Name: Ronak Robles  Date:2019  : 1965  [x]  Patient  Verified  Payor: Nathan Navarrete / Plan: Ora Aw / Product Type: Federal Funded Programs /    In [employer] time:1120  Total Treatment Time (min): 50  Visit #: 6 of 12    Medicare/BCBS Only   Total Timed Codes (min):   1:1 Treatment Time:         Treatment Area: Low back pain [M54.5]    SUBJECTIVE  Pain Level (0-10 scale): 4-5  Any medication changes, allergies to medications, adverse drug reactions, diagnosis change, or new procedure performed?: [x] No    [] Yes (see summary sheet for update)  Subjective functional status/changes:   [] No changes reported  I've been having mm spasms.      OBJECTIVE    Modality rationale: decrease pain to improve the patients ability to To tolerate post exercise soreness   Min Type Additional Details    [] Estim:  []Unatt       []IFC  []Premod                        []Other:  []w/ice   []w/heat  Position:  Location:    [] Estim: []Att    []TENS instruct  []NMES                    []Other:  []w/US   []w/ice   []w/heat  Position:  Location:    []  Traction: [] Cervical       []Lumbar                       [] Prone          []Supine                       []Intermittent   []Continuous Lbs:  [] before manual  [] after manual    []  Ultrasound: []Continuous   [] Pulsed                           []1MHz   []3MHz W/cm2:  Location:    []  Iontophoresis with dexamethasone         Location: [] Take home patch   [] In clinic   10 []  Ice     [x]  heat  []  Ice massage  []  Laser   []  Anodyne Position:  Location:    []  Laser with stim  []  Other:  Position:  Location:    []  Vasopneumatic Device Pressure:       [] lo [] med [] hi   Temperature: [] lo [] med [] hi   [] Skin assessment post-treatment:  []intact []redness- no adverse reaction        8 min Therapeutic Exercise:  [] See flow sheet :   Rationale: increase ROM and increase strength to improve the patients ability to perform daily activities     24 min Neuromuscular Re-education:  []  See flow sheet :   Rationale: increase ROM and increase strength  to improve the patients ability to recruit TA and glutes for daily activities    8 min Manual Therapy:  Per flow sheet   Rationale: decrease pain, increase ROM, increase tissue extensibility and decrease trigger points to level pelivs         With   [] TE   [] TA   [] neuro   [] other: Patient Education: [x] Review HEP    [] Progressed/Changed HEP based on:   [] positioning   [] body mechanics   [] transfers   [] heat/ice application    [] other:      Other Objective/Functional Measures: right upslip     Pain Level (0-10 scale) post treatment: 0    ASSESSMENT/Changes in Function: Slow progress up to this point. Cueing for TA recruitment with most supine exercises. Patient will continue to benefit from skilled PT services to modify and progress therapeutic interventions, address functional mobility deficits, address strength deficits, analyze and address soft tissue restrictions, analyze and cue movement patterns and assess and modify postural abnormalities to attain remaining goals. []  See Plan of Care  []  See progress note/recertification  []  See Discharge Summary         Progress towards goals / Updatgished in 2 weeks:  1. Pt will be (I) and complaint w/ HEP to progress gains in PT. goal met5/7/19     Long Term Goals: To be accomplished in 6 weeks:  1. Pt will improve FOTO to > or = to 66 to demo improved function. 2. Pt will improve core strength to > or = to 4/5 for ease w/ prolonged standing to cook dinner. Not met- cueing to correct rib flare in supine  5/16/19  3. Pt will left hip MMT to > or = to 4+/5 for ease w/ performing heavier activity around her house. 4. Pt will report > or = to 60% overall improvement in symptoms for ease w/ return to unlimited ADLs.          PLAN  []  Upgrade activities as tolerated     [x]  Continue plan of care  [] Update interventions per flow sheet       []  Discharge due to:_  []  Other:_      Kailash Martin, PT 5/28/2019  10:57 AM    Future Appointments   Date Time Provider Eliana Christian   5/30/2019 10:00 AM Christopher Dhillon, PT [facility]   6/3/2019  2:30 PM Loni Grissom, PT [facility]   6/5/2019 10:00 AM Loni Grissom, PT [facility]   6/11/2019 10:30 AM Christopher Dhillon, PT [facility]   6/14/2019  9:00 AM Lenora BRADSHAW, PT [facility]   7/16/2019  8:00 AM Tona Ventura, [facility] SCHED

## 2019-05-30 ENCOUNTER — HOSPITAL ENCOUNTER (OUTPATIENT)
Dept: PHYSICAL THERAPY | Age: 54
Discharge: HOME | End: 2019-05-30
Payer: COMMERCIAL

## 2019-05-30 PROCEDURE — 97110 THERAPEUTIC EXERCISES: CPT

## 2019-05-30 PROCEDURE — 97140 MANUAL THERAPY 1/> REGIONS: CPT

## 2019-05-30 PROCEDURE — 97112 NEUROMUSCULAR REEDUCATION: CPT

## 2019-06-03 ENCOUNTER — HOSPITAL ENCOUNTER (OUTPATIENT)
Dept: PHYSICAL THERAPY | Age: 54
Discharge: HOME | End: 2019-06-03
Payer: COMMERCIAL

## 2019-06-03 PROCEDURE — 97112 NEUROMUSCULAR REEDUCATION: CPT | Performed by: PHYSICAL THERAPIST

## 2019-06-03 PROCEDURE — 97110 THERAPEUTIC EXERCISES: CPT | Performed by: PHYSICAL THERAPIST

## 2019-06-03 PROCEDURE — 97140 MANUAL THERAPY 1/> REGIONS: CPT | Performed by: PHYSICAL THERAPIST

## 2019-06-03 NOTE — PROGRESS NOTES
PT DAILY TREATMENT NOTE 10-18    Patient Name: Estrella Mohamud  Date:6/3/2019  : 1965  [x]  Patient  Verified  Payor: Terry Martinez / Plan: Savannah Verdugo / Product Type: Federal Funded Programs /    In [employer] time:334  Total Treatment Time (min): 46  Visit #: 8 of 12    Medicare/BCBS Only   Total Timed Codes (min):   1:1 Treatment Time:         Treatment Area: Low back pain [M54.5]    SUBJECTIVE  Pain Level (0-10 scale): 3/10  Any medication changes, allergies to medications, adverse drug reactions, diagnosis change, or new procedure performed?: [x] No    [] Yes (see summary sheet for update)  Subjective functional status/changes:   [] No changes reported  Pt reports 75% overall improvement in symptoms     OBJECTIVE    Modality rationale: decrease pain and increase tissue extensibility to improve the patients ability to improve post exercise soreness   Min Type Additional Details    [] Estim:  []Unatt       []IFC  []Premod                        []Other:  []w/ice   []w/heat  Position:  Location:    [] Estim: []Att    []TENS instruct  []NMES                    []Other:  []w/US   []w/ice   []w/heat  Position:  Location:    []  Traction: [] Cervical       []Lumbar                       [] Prone          []Supine                       []Intermittent   []Continuous Lbs:  [] before manual  [] after manual    []  Ultrasound: []Continuous   [] Pulsed                           []1MHz   []3MHz W/cm2:  Location:    []  Iontophoresis with dexamethasone         Location: [] Take home patch   [] In clinic   10 []  Ice     [x]  heat  []  Ice massage  []  Laser   []  Anodyne Position: seated  Location: l/s     []  Laser with stim  []  Other:  Position:  Location:    []  Vasopneumatic Device Pressure:       [] lo [] med [] hi   Temperature: [] lo [] med [] hi   [] Skin assessment post-treatment:  []intact []redness- no adverse reaction    []redness  adverse reaction:      min []Eval                  []Re-Eval 10 min Therapeutic Exercise:  [x] See flow sheet :   Rationale: increase ROM and increase strength to improve the patients ability to improve activity tolernace      min Therapeutic Activity:  []  See flow sheet :   Rationale:   to improve the patients ability to      18 min Neuromuscular Re-education:  [x]  See flow sheet : progressed per flow sheet    Rationale: increase strength, improve coordination, improve balance and increase proprioception  to improve the patients ability to decrease pain and improve stability     8 min Manual Therapy:  Alignment check, TPR/DTM to B glutes, piriformis, manual str to B hip flexors, left hip PA mobs    Rationale: decrease pain, increase ROM, increase tissue extensibility and decrease trigger points to improve mobility      min Gait Training:  ___ feet with ___ device on level surfaces with ___ level of assist   Rationale: With   [] TE   [] TA   [] neuro   [] other: Patient Education: [x] Review HEP    [] Progressed/Changed HEP based on:   [] positioning   [] body mechanics   [] transfers   [] heat/ice application    [] other:      Other Objective/Functional Measures:      Pain Level (0-10 scale) post treatment: 0/10    ASSESSMENT/Changes in Function: Pt continues to require mod VC's for segmental motion of lumbar spine. Denies pain at the end of session. Patient will continue to benefit from skilled PT services to modify and progress therapeutic interventions, address functional mobility deficits, address ROM deficits, address strength deficits, analyze and address soft tissue restrictions, analyze and cue movement patterns, analyze and modify body mechanics/ergonomics, assess and modify postural abnormalities and instruct in home and community integration to attain remaining goals.      []  See Plan of Care  []  See progress note/recertification  []  See Discharge Summary         Progress towards goals / Updated goals:  Short Term Goals: To be accomplished in 2 weeks:  1. Pt will be (I) and complaint w/ HEP to progress gains in PT. goal met5/7/19     Long Term Goals: To be accomplished in 6 weeks:  1. Pt will improve FOTO to > or = to 66 to demo improved function. 2. Pt will improve core strength to > or = to 4/5 for ease w/ prolonged standing to cook dinner. Not met- cueing to correct rib flare in supine  5/16/19; progressing with refomrer exercises 5/30/19  3. Pt will left hip MMT to > or = to 4+/5 for ease w/ performing heavier activity around her house. 4. Pt will report > or = to 60% overall improvement in symptoms for ease w/ return to unlimited ADLs.  - met, pt reports 75% overall improvement in symptoms 6/3/19    PLAN  [x]  Upgrade activities as tolerated     [x]  Continue plan of care  []  Update interventions per flow sheet       []  Discharge due to:_  []  Other:_      Michelle Mcintosh, PT 6/3/2019  3:04 PM    Future Appointments   Date Time Provider Eliana Christian   6/5/2019 10:00 AM [facility]   6/11/2019 10:30 AM [facility]   6/14/2019  9:00 AM [facility]   7/16/2019  8:00 AM Lorenzo, [address] [place] [address], [facility] MICHAEL DIAZ

## 2019-06-05 ENCOUNTER — HOSPITAL ENCOUNTER (OUTPATIENT)
Dept: PHYSICAL THERAPY | Age: 54
Discharge: HOME | End: 2019-06-05
Payer: COMMERCIAL

## 2019-06-05 PROCEDURE — 97140 MANUAL THERAPY 1/> REGIONS: CPT | Performed by: PHYSICAL THERAPIST

## 2019-06-05 PROCEDURE — 97110 THERAPEUTIC EXERCISES: CPT | Performed by: PHYSICAL THERAPIST

## 2019-06-05 NOTE — PROGRESS NOTES
PT DAILY TREATMENT NOTE 10-18    Patient Name: Michelle Walters  Date:2019  : 1965  [x]  Patient  Verified  Payor: Bernardino Sanchez / Plan: Christiane Meigs / Product Type: Federal Funded Programs /    In time:1000  Out time:1043  Total Treatment Time (min): 43  Visit #: 9 of 12    Medicare/BCBS Only   Total Timed Codes (min):   1:1 Treatment Time:         Treatment Area: Low back pain [M54.5]    SUBJECTIVE  Pain Level (0-10 scale): 410  Any medication changes, allergies to medications, adverse drug reactions, diagnosis change, or new procedure performed?: [x] No    [] Yes (see summary sheet for update)  Subjective functional status/changes:   [] No changes reported  Pt enters session stating she feels like she is about to have a bad back spasm and it just started about 30 minutes ago w/o known EM    OBJECTIVE    Modality rationale: decrease pain and increase tissue extensibility to improve the patients ability to decrease mm spasm and improve activity tolerance   Min Type Additional Details    [] Estim:  []Unatt       []IFC  []Premod                        []Other:  []w/ice   []w/heat  Position:  Location:    [] Estim: []Att    []TENS instruct  []NMES                    []Other:  []w/US   []w/ice   []w/heat  Position:  Location:    []  Traction: [] Cervical       []Lumbar                       [] Prone          []Supine                       []Intermittent   []Continuous Lbs:  [] before manual  [] after manual    []  Ultrasound: []Continuous   [] Pulsed                           []1MHz   []3MHz W/cm2:  Location:    []  Iontophoresis with dexamethasone         Location: [] Take home patch   [] In clinic   10 []  Ice     [x]  heat  []  Ice massage  []  Laser   []  Anodyne Position: supine w/ LE elevation  Location: mid to lower back    []  Laser with stim  []  Other:  Position:  Location:    []  Vasopneumatic Device Pressure:       [] lo [] med [] hi   Temperature: [] lo [] med [] hi   [] Skin assessment post-treatment:  []intact []redness- no adverse reaction    []redness  adverse reaction:      min []Eval                  []Re-Eval       10 min Therapeutic Exercise:  [x] See flow sheet : adjusted per flow sheet    Rationale: increase ROM and improve coordination to improve the patients ability to decrease pain and improve mobility      min Therapeutic Activity:  []  See flow sheet :   Rationale:   to improve the patients ability to       min Neuromuscular Re-education:  []  See flow sheet :   Rationale:   to improve the patients ability to     23 min Manual Therapy:  TPR/DTM to B thoracolumbar parapsinals, t/s PA's, rib springs, grade 4 manip w/ audible cavitations   Rationale: decrease pain, increase ROM, increase tissue extensibility, decrease trigger points and increase postural awareness to improve activity tolerance and avoid active muscle spasms     min Gait Training:  ___ feet with ___ device on level surfaces with ___ level of assist   Rationale: With   [] TE   [] TA   [] neuro   [] other: Patient Education: [x] Review HEP    [] Progressed/Changed HEP based on:   [] positioning   [] body mechanics   [] transfers   [] heat/ice application    [] other:      Other Objective/Functional Measures:      Pain Level (0-10 scale) post treatment: 0/10    ASSESSMENT/Changes in Function: Initiated session w/ MH to mid to low back, focused on manual to alleviate spasms and adjusted ex's to improve mobility and mm tension to avoid full spasm. Multiple cavitations were eventually achieved improving pt's pain and relieving the spasms. Followed up with light stretching. Plan to resume normal routine as able NV.      Patient will continue to benefit from skilled PT services to modify and progress therapeutic interventions, address functional mobility deficits, address ROM deficits, address strength deficits, analyze and address soft tissue restrictions, analyze and cue movement patterns, analyze and modify body mechanics/ergonomics, assess and modify postural abnormalities and instruct in home and community integration to attain remaining goals. []  See Plan of Care  []  See progress note/recertification  []  See Discharge Summary         Progress towards goals / Updated goals:  Short Term Goals: To be accomplished in 2 weeks:  1. Pt will be (I) and complaint w/ HEP to progress gains in PT. goal met5/7/19     Long Term Goals: To be accomplished in 6 weeks:  1. Pt will improve FOTO to > or = to 66 to demo improved function. 2. Pt will improve core strength to > or = to 4/5 for ease w/ prolonged standing to cook dinner. Not met- cueing to correct rib flare in supine  5/16/19; progressing with refomrer exercises 5/30/19  3. Pt will left hip MMT to > or = to 4+/5 for ease w/ performing heavier activity around her house. 4. Pt will report > or = to 60% overall improvement in symptoms for ease w/ return to unlimited ADLs.  - met, pt reports 75% overall improvement in symptoms 6/3/19    PLAN  [x]  Upgrade activities as tolerated     [x]  Continue plan of care  []  Update interventions per flow sheet       []  Discharge due to:_  []  Other:_      Dora Foster, PT 6/5/2019  11:10 AM    Future Appointments   Date Time Provider Eliana Christian   6/11/2019 10:30 AM [facility]   6/14/2019  9:00 [facility]   7/16/2019  8:00 AM Aria Ventura PA [facility] SCHED

## 2019-06-11 ENCOUNTER — HOSPITAL ENCOUNTER (OUTPATIENT)
Dept: PHYSICAL THERAPY | Age: 54
Discharge: HOME | End: 2019-06-11
Payer: COMMERCIAL

## 2019-06-11 PROCEDURE — 97140 MANUAL THERAPY 1/> REGIONS: CPT

## 2019-06-11 PROCEDURE — 97110 THERAPEUTIC EXERCISES: CPT

## 2019-06-11 NOTE — PROGRESS NOTES
PT DAILY TREATMENT NOTE 10-18    Patient Name: Gilford Dirks  Date:2019   [x]  Patient  Verified  Payor: Geremias Devlin / Plan: Queen Hockey / Product Type: Federal Funded Programs /    In [employer] time:1118  Total Treatment Time (min): 48  Visit #: 10 of 12    Medicare/BCBS Only   Total Timed Codes (min):   1:1 Treatment Time:         Treatment Area: Low back pain [M54.5]    SUBJECTIVE  Pain Level (0-10 scale): 3  Any medication changes, allergies to medications, adverse drug reactions, diagnosis change, or new procedure performed?: [x] No    [] Yes (see summary sheet for update)  Subjective functional status/changes:   [] No changes reported  I'm feeling pretty good today. I haven't had anymore mm spasms since last time.      OBJECTIVE    Modality rationale: decrease pain to improve the patients ability to To tolerate post exercise soreness   Min Type Additional Details    [] Estim:  []Unatt       []IFC  []Premod                        []Other:  []w/ice   []w/heat  Position:  Location:    [] Estim: []Att    []TENS instruct  []NMES                    []Other:  []w/US   []w/ice   []w/heat  Position:  Location:    []  Traction: [] Cervical       []Lumbar                       [] Prone          []Supine                       []Intermittent   []Continuous Lbs:  [] before manual  [] after manual    []  Ultrasound: []Continuous   [] Pulsed                           []1MHz   []3MHz W/cm2:  Location:    []  Iontophoresis with dexamethasone         Location: [] Take home patch   [] In clinic   10 []  Ice     [x]  heat  []  Ice massage  []  Laser   []  Anodyne Position:seated  Location:T/s and L/s    []  Laser with stim  []  Other:  Position:  Location:    []  Vasopneumatic Device Pressure:       [] lo [] med [] hi   Temperature: [] lo [] med [] hi   [] Skin assessment post-treatment:  []intact []redness- no adverse reaction        30 min Therapeutic Exercise:  [] See flow sheet :   Rationale: increase ROM and increase strength to improve the patients ability to perform daily activities      8 min Manual Therapy:  T/S PAs; rib springs   Rationale: decrease pain and increase ROM to improve T/S mobility/decrease future mm spasms         With   [] TE   [] TA   [] neuro   [] other: Patient Education: [x] Review HEP    [] Progressed/Changed HEP based on:   [] positioning   [] body mechanics   [] transfers   [] heat/ice application    [] other:      Other Objective/Functional Measures:  FOTO 65    Pain Level (0-10 scale) post treatment: 0    ASSESSMENT/Changes in Function: Multiple T/S cavitations with PAs and rib springs. Minimal soft tissue tenderness today. Continue 2x week x 2 weeks. Patient will continue to benefit from skilled PT services to modify and progress therapeutic interventions, address functional mobility deficits, address ROM deficits, address strength deficits, analyze and address soft tissue restrictions, analyze and cue movement patterns and assess and modify postural abnormalities to attain remaining goals. []  See Plan of Care  []  See progress note/recertification  []  See Discharge Summary         Progress towards goals / Updated goals:  Short Term Goals: To be accomplished in 2 weeks:  1. Pt will be (I) and complaint w/ HEP to progress gains in PT. goal met5/7/19     Long Term Goals: To be accomplished in 6 weeks:  1. Pt will improve FOTO to > or = to 66 to demo improved function. Essentially met- 66 on 6/11/19  2. Pt will improve core strength to > or = to 4/5 for ease w/ prolonged standing to cook dinner. Not met- cueing to correct rib flare in supine  5/16/19; progressing with refomrer exercises 5/30/19  3. Pt will left hip MMT to > or = to 4+/5 for ease w/ performing heavier activity around her house. Progressing- grossly 4/5 6/11/19  4. Pt will report > or = to 60% overall improvement in symptoms for ease w/ return to unlimited ADLs.  - met, pt reports 75% overall improvement in symptoms 6/3/19        PLAN  [x]  Upgrade activities as tolerated     []  Continue plan of care  []  Update interventions per flow sheet       []  Discharge due to:_  []  Other:_      Mercedes Luz, PT 6/11/2019  10:35 AM    Future Appointments   Date Time Provider Eliana Christian   6/14/2019  9:00 [facility]   7/16/2019  8:00 AM Zak Ventura, PA [facility]

## 2019-06-11 NOTE — PROGRESS NOTES
[facility]  [address]  [place], [address]  [phone] fax    Physical Therapy Progress Note  Patient name: Ariane Duran Start of Care: 19   Referral source: Jewel Cobb NP : 1965   Medical/Treatment Diagnosis: Low back pain [M54.5]  Payor: Isabella Rollins / Plan: Fresh ! / Product Type: [employer] Programs /  Onset FSFX:[id number] with flare up in the last year     Prior Hospitalization: see medical history Provider#: [id number]   Medications: Verified on Patient Summary List    Comorbidities: allergies, back pain, BMI > 30, CA, osteoporosis, prior surgery   Prior Level of Function: Hx of intermittent back pain limiting heavy household activity and prolonged standing, retired      Visits from [facility] Care: 10    Missed Visits: 0      Established Goals:        Excellent         Good         Limited            None  [x] Increased ROM   []  [x]  []  []  [x] Increased Strength  []  [x]  []  []  [x] Increased Mobility  []  [x]  []  []   [x] Decreased Pain   []  [x]  []  []  [] Decreased Swelling  []  []  []  []    Key Functional Changes: FOTO 65    Updated Goals: to be achieved in 2 weeks: continue towards unmet goals   1. Pt will improve FOTO to > or = to 66 to demo improved function. Essentially met- 65 on 19  2. Pt will improve core strength to > or = to 4/5 for ease w/ prolonged standing to cook dinner. Not met- cueing to correct rib flare in supine  19; progressing with refomrer exercises 19  3. Pt will left hip MMT to > or = to 4+/5 for ease w/ performing heavier activity around her house. Progressing- grossly 4/5 19  4. Pt will report > or = to 60% overall improvement in symptoms for ease w/ return to unlimited ADLs.  - met, pt reports 75% overall improvement in symptoms 6/3/19     ASSESSMENT/RECOMMENDATIONS:  [x]Continue therapy per initial plan/protocol at a frequency of  2 x per week for 2 weeks  []Continue therapy with the following recommended changes:_____________________      _____________________________________________________________________  []Discontinue therapy progressing towards or have reached established goals  []Discontinue therapy due to lack of appreciable progress towards goals  []Discontinue therapy due to lack of attendance or compliance  []Await Physician's recommendations/decisions regarding therapy  []Other:________________________________________________________________    Thank you for this referral.    Fariba Shah, PT 6/11/2019 11:26 AM  NOTE TO PHYSICIAN:  PLEASE COMPLETE THE ORDERS BELOW AND   FAX TO [facility]: [phone]  If you are unable to process this request in 24 hours please contact our office: [phone] I have read the above report and request that my patient continue as recommended. ? I have read the above report and request that my patient continue therapy with the following changes/special instructions:__________________________________________________________  ? I have read the above report and request that my patient be discharged from therapy.     Physicians signature: ______________________________Date: ______Time:______

## 2019-06-14 ENCOUNTER — HOSPITAL ENCOUNTER (OUTPATIENT)
Dept: PHYSICAL THERAPY | Age: 54
Discharge: HOME | End: 2019-06-14
Payer: COMMERCIAL

## 2019-06-14 PROCEDURE — 97112 NEUROMUSCULAR REEDUCATION: CPT

## 2019-06-14 PROCEDURE — 97110 THERAPEUTIC EXERCISES: CPT

## 2019-06-14 NOTE — PROGRESS NOTES
PT DAILY TREATMENT NOTE 10-18    Patient Name: Glory Diop  Date:2019  : 1965  [x]  Patient  Verified  Payor: Frances Gao / Plan: Mckenna Fall / Product Type: Federal Funded Programs /    In time:900  Out time:953  Total Treatment Time (min): 53  Visit #: 1 of 4    Medicare/BCBS Only   Total Timed Codes (min):   1:1 Treatment Time:         Treatment Area: Low back pain [M54.5]    SUBJECTIVE  Pain Level (0-10 scale): 3  Any medication changes, allergies to medications, adverse drug reactions, diagnosis change, or new procedure performed?: [x] No    [] Yes (see summary sheet for update)  Subjective functional status/changes:   [] No changes reported  I'm feeling better. I haven't had any spasms in over a week.      OBJECTIVE    Modality rationale: decrease pain to improve the patients ability to To tolerate post exercise soreness   Min Type Additional Details    [] Estim:  []Unatt       []IFC  []Premod                        []Other:  []w/ice   []w/heat  Position:  Location:    [] Estim: []Att    []TENS instruct  []NMES                    []Other:  []w/US   []w/ice   []w/heat  Position:  Location:    []  Traction: [] Cervical       []Lumbar                       [] Prone          []Supine                       []Intermittent   []Continuous Lbs:  [] before manual  [] after manual    []  Ultrasound: []Continuous   [] Pulsed                           []1MHz   []3MHz W/cm2:  Location:    []  Iontophoresis with dexamethasone         Location: [] Take home patch   [] In clinic   10 [x]  Ice     []  heat  []  Ice massage  []  Laser   []  Anodyne Position:seated  Location:L/S    []  Laser with stim  []  Other:  Position:  Location:    []  Vasopneumatic Device Pressure:       [] lo [] med [] hi   Temperature: [] lo [] med [] hi   [] Skin assessment post-treatment:  []intact []redness- no adverse reaction      8 min Therapeutic Exercise:  [] See flow sheet :   Rationale: increase ROM to improve the patients ability to perform daily activities    35 min Neuromuscular Re-education:  []  See flow sheet :   Rationale: increase ROM and increase strength  to improve the patients ability to recruit ant and post chain          With   [] TE   [] TA   [] neuro   [] other: Patient Education: [x] Review HEP    [] Progressed/Changed HEP based on:   [] positioning   [] body mechanics   [] transfers   [] heat/ice application    [] other:      Other Objective/Functional Measures:      Pain Level (0-10 scale) post treatment: 0    ASSESSMENT/Changes in Function: Challenged with QP series;compensated with dumping into ext. Patient will continue to benefit from skilled PT services to modify and progress therapeutic interventions, address functional mobility deficits, address ROM deficits, address strength deficits, analyze and address soft tissue restrictions, analyze and cue movement patterns and assess and modify postural abnormalities to attain remaining goals. []  See Plan of Care  []  See progress note/recertification  []  See Discharge Summary         Progress towards goals / Updated goals:     1. Pt will improve FOTO to > or = to 66 to demo improved function. Essentially met- 65 on 6/11/19  2. Pt will improve core strength to > or = to 4/5 for ease w/ prolonged standing to cook dinner. Not met- cueing to correct rib flare in supine  5/16/19; progressing with refomrer exercises 5/30/19  3. Pt will left hip MMT to > or = to 4+/5 for ease w/ performing heavier activity around her house. Progressing- grossly 4/5 6/11/19  4. Pt will report > or = to 60% overall improvement in symptoms for ease w/ return to unlimited ADLs.  - met, pt reports 75% overall improvement in symptoms 6/3/19        PLAN  []  Upgrade activities as tolerated     [x]  Continue plan of care  []  Update interventions per flow sheet       []  Discharge due to:_  []  Other:_      Ban Glaser, PT 6/14/2019  9:29 AM    Future Appointments   Date Time Provider Eliana Christian   6/17/2019  2:30 PM Magdy Lucio, PT [facility]   6/18/2019 12:00 PM Magdy Lucio, PT [facility]   6/24/2019  2:30 PM Dulce Maria Penny, PT [facility]   7/16/2019  8:00 AM Domenico Ventura PA [facility]

## 2019-06-17 ENCOUNTER — HOSPITAL ENCOUNTER (OUTPATIENT)
Dept: PHYSICAL THERAPY | Age: 54
Discharge: HOME | End: 2019-06-17
Payer: COMMERCIAL

## 2019-06-17 PROCEDURE — 97110 THERAPEUTIC EXERCISES: CPT

## 2019-06-17 PROCEDURE — 97112 NEUROMUSCULAR REEDUCATION: CPT

## 2019-06-17 NOTE — PROGRESS NOTES
PT DAILY TREATMENT NOTE 10-18    Patient Name: Trino Rico  Date:2019  : 1965  [x]  Patient  Verified  Payor: Lucio Hammond / Plan: Shyp / Product Type: Federal Funded Programs /    In [employer] time:329  Total Treatment Time (min): 61  Visit #: 2 of 4    Medicare/BCBS Only   Total Timed Codes (min):   1:1 Treatment Time:         Treatment Area: Low back pain [M54.5]    SUBJECTIVE  Pain Level (0-10 scale): 5  Any medication changes, allergies to medications, adverse drug reactions, diagnosis change, or new procedure performed?: [x] No    [] Yes (see summary sheet for update)  Subjective functional status/changes:   [] No changes reported  I think what we did last time irritated my back.     OBJECTIVE    Modality rationale: decrease pain to improve the patients ability to To tolerate post exercise soreness   Min Type Additional Details    [] Estim:  []Unatt       []IFC  []Premod                        []Other:  []w/ice   []w/heat  Position:  Location:    [] Estim: []Att    []TENS instruct  []NMES                    []Other:  []w/US   []w/ice   []w/heat  Position:  Location:    []  Traction: [] Cervical       []Lumbar                       [] Prone          []Supine                       []Intermittent   []Continuous Lbs:  [] before manual  [] after manual    []  Ultrasound: []Continuous   [] Pulsed                           []1MHz   []3MHz W/cm2:  Location:    []  Iontophoresis with dexamethasone         Location: [] Take home patch   [] In clinic   10 []  Ice     [x]  heat  []  Ice massage  []  Laser   []  Anodyne Position:seated  Location:L/S    []  Laser with stim  []  Other:  Position:  Location:    []  Vasopneumatic Device Pressure:       [] lo [] med [] hi   Temperature: [] lo [] med [] hi   [] Skin assessment post-treatment:  []intact []redness- no adverse reaction        8 min Therapeutic Exercise:  [] See flow sheet :   Rationale: increase ROM to improve the patients ability to perform ADLs    41 min Neuromuscular Re-education:  []  See flow sheet :   Rationale: increase ROM, increase strength, improve coordination, improve balance and increase proprioception  to improve the patients ability to engage TA and post chain for daily activities          With   [] TE   [] TA   [] neuro   [] other: Patient Education: [x] Review HEP    [] Progressed/Changed HEP based on:   [] positioning   [] body mechanics   [] transfers   [] heat/ice application    [] other:      Other Objective/Functional Measures:      Pain Level (0-10 scale) post treatment: 0    ASSESSMENT/Changes in Function: Modified stability exercises and used DD versus oov. Able to stabilize and engage TA. Patient will continue to benefit from skilled PT services to modify and progress therapeutic interventions, address functional mobility deficits, address ROM deficits, address strength deficits, analyze and address soft tissue restrictions, analyze and cue movement patterns and assess and modify postural abnormalities to attain remaining goals. []  See Plan of Care  []  See progress note/recertification  []  See Discharge Summary         Progress towards goals / Updated goals:  1. Pt will improve FOTO to > or = to 66 to demo improved function. Essentially met- 65 on 6/11/19  2. Pt will improve core strength to > or = to 4/5 for ease w/ prolonged standing to cook dinner. Not met- cueing to correct rib flare in supine  5/16/19; progressing with refomrer exercises 5/30/19  3. Pt will left hip MMT to > or = to 4+/5 for ease w/ performing heavier activity around her house. Progressing- grossly 4/5 6/11/19  4. Pt will report > or = to 60% overall improvement in symptoms for ease w/ return to unlimited ADLs.  - met, pt reports 75% overall improvement in symptoms 6/3/19        PLAN  []  Upgrade activities as tolerated     [x]  Continue plan of care  []  Update interventions per flow sheet       []  Discharge due to:_  [] Other:_      Zakiya Novoa, PT 6/17/2019  11:58 AM    Future Appointments   Date Time Provider Eliana Gatesi   6/17/2019  2:30 PM Mario Gamez, PT [facility]   6/20/2019  8:30 AM Alexx Lea, PT [facility]   6/24/2019  2:30 PM Alexx Lea, PT [facility]   7/16/2019  8:00 AM Tom Ventura PA [facility]

## 2019-06-18 ENCOUNTER — APPOINTMENT (OUTPATIENT)
Dept: PHYSICAL THERAPY | Age: 54
End: 2019-06-18
Payer: COMMERCIAL

## 2019-06-20 ENCOUNTER — HOSPITAL ENCOUNTER (OUTPATIENT)
Dept: PHYSICAL THERAPY | Age: 54
Discharge: HOME | End: 2019-06-20
Payer: COMMERCIAL

## 2019-06-20 PROCEDURE — 97110 THERAPEUTIC EXERCISES: CPT | Performed by: PHYSICAL THERAPIST

## 2019-06-20 PROCEDURE — 97112 NEUROMUSCULAR REEDUCATION: CPT | Performed by: PHYSICAL THERAPIST

## 2019-06-20 NOTE — PROGRESS NOTES
PT DAILY TREATMENT NOTE 10-18    Patient Name: Pao Chavez  Date:2019  : 1965  [x]  Patient  Verified  Payor: Brandon Wells / Plan: Shalom Snell / Product Type: Federal Funded Programs /    In [employer] time:925  Total Treatment Time (min): 54  Visit #: 3 of 4    Medicare/BCBS Only   Total Timed Codes (min):   1:1 Treatment Time:         Treatment Area: Low back pain [M54.5]    SUBJECTIVE  Pain Level (0-10 scale): 2/10  Any medication changes, allergies to medications, adverse drug reactions, diagnosis change, or new procedure performed?: [x] No    [] Yes (see summary sheet for update)  Subjective functional status/changes:   [] No changes reported  Pt reports she is still sore from last visit but feels that she's working the right areas. Agrees w/ DC NV.      OBJECTIVE    Modality rationale: decrease pain and increase tissue extensibility to improve the patients ability to improve post workout soreness    Min Type Additional Details    [] Estim:  []Unatt       []IFC  []Premod                        []Other:  []w/ice   []w/heat  Position:  Location:    [] Estim: []Att    []TENS instruct  []NMES                    []Other:  []w/US   []w/ice   []w/heat  Position:  Location:    []  Traction: [] Cervical       []Lumbar                       [] Prone          []Supine                       []Intermittent   []Continuous Lbs:  [] before manual  [] after manual    []  Ultrasound: []Continuous   [] Pulsed                           []1MHz   []3MHz W/cm2:  Location:    []  Iontophoresis with dexamethasone         Location: [] Take home patch   [] In clinic   10 []  Ice     [x]  heat  []  Ice massage  []  Laser   []  Anodyne Position: long sitting  Location: low back    []  Laser with stim  []  Other:  Position:  Location:    []  Vasopneumatic Device Pressure:       [] lo [] med [] hi   Temperature: [] lo [] med [] hi   [] Skin assessment post-treatment:  []intact []redness- no adverse reaction []redness  adverse reaction:      min []Eval                  []Re-Eval       8 min Therapeutic Exercise:  [x] See flow sheet : progressed per flow sheet    Rationale: increase ROM and increase strength to improve the patients ability to improve endurance     min Therapeutic Activity:  []  See flow sheet :   Rationale:   to improve the patients ability to      36 min Neuromuscular Re-education:  [x]  See flow sheet : oov and pilates ex's to improve core activation   Rationale: increase strength, improve coordination, improve balance and increase proprioception  to improve the patients ability to decrease pain and improve stability      min Manual Therapy:     Rationale:  to      min Gait Training:  ___ feet with ___ device on level surfaces with ___ level of assist   Rationale: With   [] TE   [] TA   [] neuro   [] other: Patient Education: [x] Review HEP    [] Progressed/Changed HEP based on:   [] positioning   [] body mechanics   [] transfers   [] heat/ice application    [] other:      Other Objective/Functional Measures: see below      Pain Level (0-10 scale) post treatment: 0/10    ASSESSMENT/Changes in Function: Pt is progressing well and requires less VC's for rib postioning during ex's. Plan to DC NV. Patient will continue to benefit from skilled PT services to modify and progress therapeutic interventions, analyze and cue movement patterns, analyze and modify body mechanics/ergonomics, assess and modify postural abnormalities and instruct in home and community integration to attain remaining goals. []  See Plan of Care  []  See progress note/recertification  []  See Discharge Summary         Progress towards goals / Updated goals:  1. Pt will improve FOTO to > or = to 66 to demo improved function. Essentially met- 65 on 6/11/19  2.  Pt will improve core strength to > or = to 4/5 for ease w/ prolonged standing to cook dinner. Not met- cueing to correct rib flare in supine  5/16/19; progressing with refomrer exercises 5/30/19  3. Pt will left hip MMT to > or = to 4+/5 for ease w/ performing heavier activity around her house. met, left hip grossly 5/5 6/20/19  4. Pt will report > or = to 60% overall improvement in symptoms for ease w/ return to unlimited ADLs.  - met, pt reports 75% overall improvement in symptoms 6/3/19      PLAN  [x]  Upgrade activities as tolerated     [x]  Continue plan of care  []  Update interventions per flow sheet       []  Discharge due to:_  []  Other:_      Dora Foster, PT 6/20/2019  8:44 AM    Future Appointments   Date Time Provider Eliana Gatesi   6/24/2019  2:30 PM Dakota Robles, PT [facility]   7/16/2019  8:00 AM Aria Ventura PA [facility] [address]

## 2019-06-24 ENCOUNTER — HOSPITAL ENCOUNTER (OUTPATIENT)
Dept: PHYSICAL THERAPY | Age: 54
Discharge: HOME | End: 2019-06-24
Payer: COMMERCIAL

## 2019-06-24 PROCEDURE — 97110 THERAPEUTIC EXERCISES: CPT | Performed by: PHYSICAL THERAPIST

## 2019-06-24 PROCEDURE — 97112 NEUROMUSCULAR REEDUCATION: CPT | Performed by: PHYSICAL THERAPIST

## 2019-06-24 NOTE — PROGRESS NOTES
PT DAILY TREATMENT NOTE 10-18    Patient Name: Femi Garcia  Date:2019  : 1965  [x]  Patient  Verified  Payor: Carmen Cutler / Plan: Lucia Lara / Product Type: Federal Funded Programs /    In [employer] time:334  Total Treatment Time (min): 57  Visit #: 4 of 4    Medicare/BCBS Only   Total Timed Codes (min):   1:1 Treatment Time:         Treatment Area: Low back pain [M54.5]    SUBJECTIVE  Pain Level (0-10 scale): 2/10  Any medication changes, allergies to medications, adverse drug reactions, diagnosis change, or new procedure performed?: [x] No    [] Yes (see summary sheet for update)  Subjective functional status/changes:   [] No changes reported  Pt reports she feels 100% better since starting PT    OBJECTIVE    Modality rationale: decrease pain and increase tissue extensibility to improve the patients ability to improve post workout soreness    Min Type Additional Details    [] Estim:  []Unatt       []IFC  []Premod                        []Other:  []w/ice   []w/heat  Position:  Location:    [] Estim: []Att    []TENS instruct  []NMES                    []Other:  []w/US   []w/ice   []w/heat  Position:  Location:    []  Traction: [] Cervical       []Lumbar                       [] Prone          []Supine                       []Intermittent   []Continuous Lbs:  [] before manual  [] after manual    []  Ultrasound: []Continuous   [] Pulsed                           []1MHz   []3MHz W/cm2:  Location:    []  Iontophoresis with dexamethasone         Location: [] Take home patch   [] In clinic   10 []  Ice     [x]  heat  []  Ice massage  []  Laser   []  Anodyne Position: sitting  Location: low back     []  Laser with stim  []  Other:  Position:  Location:    []  Vasopneumatic Device Pressure:       [] lo [] med [] hi   Temperature: [] lo [] med [] hi   [] Skin assessment post-treatment:  []intact []redness- no adverse reaction    []redness  adverse reaction:      min []Eval []Re-Eval       8 min Therapeutic Exercise:  [x] See flow sheet : progressed per flow sheet   Rationale: increase ROM and increase strength to improve the patients ability to improve endurance      min Therapeutic Activity:  []  See flow sheet :   Rationale:   to improve the patients ability to      39 min Neuromuscular Re-education:  [x]  See flow sheet :oov and pilates ex's to improve core activation   Rationale: increase strength, improve coordination, improve balance and increase proprioception  to improve the patients ability to improve core strength and activity tolerance      min Manual Therapy:     Rationale:  to      min Gait Training:  ___ feet with ___ device on level surfaces with ___ level of assist   Rationale: With   [] TE   [] TA   [] neuro   [] other: Patient Education: [x] Review HEP    [] Progressed/Changed HEP based on:   [] positioning   [] body mechanics   [] transfers   [] heat/ice application    [] other:      Other Objective/Functional Measures: see goals below     Pain Level (0-10 scale) post treatment: 0/10    ASSESSMENT/Changes in Function: Pt has made great progress with PT and has met all goals. Pt is reporting return to PLOF and is (I) and compliant w/ HEP. Appropriate for DC at this time. []  See Plan of Care  []  See progress note/recertification  [x]  See Discharge Summary         Progress towards goals / Updated goals:  1. Pt will improve FOTO to > or = to 66 to demo improved function. - met FOTO = 72 on 6/24/19  2. Pt will improve core strength to > or = to 4/5 for ease w/ prolonged standing to cook dinner. met, core strength = 4/5 grossly 6/24/19  3. Pt will left hip MMT to > or = to 4+/5 for ease w/ performing heavier activity around her house. met, left hip grossly 5/5 6/20/19  4. Pt will report > or = to 60% overall improvement in symptoms for ease w/ return to unlimited ADLs.  - met, pt reports 100% overall improvement in symptoms 6/24/19    PLAN  []  Upgrade activities as tolerated     []  Continue plan of care  []  Update interventions per flow sheet       [x]  Discharge due to: goals met, program complete  []  Other:_      Srinivasan Ace, PT 6/24/2019  3:06 PM    Future Appointments   Date Time Provider Eliana Christian   7/16/2019  8:00 AM Dom Ventura PA [address]

## 2019-06-24 NOTE — PROGRESS NOTES
Physical Therapy Discharge Instructions      [facility]  [place], [address]  [phone] fax    Patient: Noelle Richey  : 1965      Continue Home Exercise Program 1-2 times per day for 8 weeks or until normal gym program has been resumed      Continue with    [] Ice  as needed      [x] Heat           Follow up with MD:     [] Upon completion of therapy     [x] As needed      Recommendations:     [x]   Return to activity with home program    []   Return to activity with the following modifications:       []Post Rehab Program    []Join Independent aquatic program     []Return to/join local gym        Additional Comments:           Kole Summers, PT 2019 5:08 PM

## 2019-06-24 NOTE — PROGRESS NOTES
[facility]  [address]  [place], [address]  [phone] fax    Physical Therapy Discharge Summary  Patient name: Sherren Perry Start of Care: 19   Referral source: Raymond Farrell NP : 1965   Medical/Treatment Diagnosis: Low back pain [M54.5]  Payor: Iqra Sanders / Plan: Nithin Greenberg / Product Type: [employer] Programs /  Onset [id number] w/ flare up in the last year        Prior Hospitalization: see medical history Provider#: [id number]   Medications: Verified on Patient Summary List    Comorbidities: allergies, back pain, BMI > 30, CA, osteoporosis, prior surgery   Prior Level of Function: Hx of intermittent back pain limiting heavy household activity and prolonged standing, retired  Visits from [place] of Care: 14    Missed Visits: 0  Reporting Period : 19 to 19      Summary of Care:  Progress towards goals / Updated goals:  1. Pt will improve FOTO to > or = to 66 to demo improved function. - met FOTO = 72 on 19  2. Pt will improve core strength to > or = to 4/5 for ease w/ prolonged standing to cook dinner. met, core strength = 4/5 grossly 19  3. Pt will left hip MMT to > or = to 4+/5 for ease w/ performing heavier activity around her house. met, left hip grossly 5/5 19  4. Pt will report > or = to 60% overall improvement in symptoms for ease w/ return to unlimited ADLs.  - met, pt reports 100% overall improvement in symptoms 19    Pt has made great progress with PT and has met all goals. Pt is reporting return to PLOF and is (I) and compliant w/ HEP. Appropriate for DC at this time.      ASSESSMENT/RECOMMENDATIONS:  [x]Discontinue therapy: [x]Patient has reached or is progressing toward set goals      []Patient is non-compliant or has abdicated      []Due to lack of appreciable progress towards set Satinder Salamanca PT 2019 5:06 PM

## 2019-07-16 ENCOUNTER — HOSPITAL ENCOUNTER (OUTPATIENT)
Dept: LAB | Age: 54
Discharge: HOME | End: 2019-07-16
Payer: OTHER GOVERNMENT

## 2019-07-16 ENCOUNTER — OFFICE VISIT (OUTPATIENT)
Dept: FAMILY MEDICINE CLINIC | Age: 54
End: 2019-07-16

## 2019-07-16 VITALS
HEART RATE: 72 BPM | TEMPERATURE: 98.4 F | DIASTOLIC BLOOD PRESSURE: 80 MMHG | SYSTOLIC BLOOD PRESSURE: 136 MMHG | WEIGHT: 204.4 LBS | RESPIRATION RATE: 16 BRPM | HEIGHT: 66 IN | BODY MASS INDEX: 32.85 KG/M2 | OXYGEN SATURATION: 97 %

## 2019-07-16 DIAGNOSIS — D25.9 UTERINE LEIOMYOMA, UNSPECIFIED LOCATION: Primary | ICD-10-CM

## 2019-07-16 DIAGNOSIS — D05.11 DUCTAL CARCINOMA IN SITU (DCIS) OF RIGHT BREAST: ICD-10-CM

## 2019-07-16 DIAGNOSIS — R73.03 PREDIABETES: ICD-10-CM

## 2019-07-16 DIAGNOSIS — R10.2 PELVIC PAIN: ICD-10-CM

## 2019-07-16 LAB
ALBUMIN SERPL-MCNC: 3.8 G/DL (ref 3.4–5)
ALBUMIN/GLOB SERPL: 1 {RATIO} (ref 0.8–1.7)
ALP SERPL-CCNC: 158 U/L (ref 45–117)
ALT SERPL-CCNC: 43 U/L (ref 13–56)
ANION GAP SERPL CALC-SCNC: 5 MMOL/L (ref 3–18)
AST SERPL-CCNC: 21 U/L (ref 15–37)
BILIRUB SERPL-MCNC: 0.4 MG/DL (ref 0.2–1)
BUN SERPL-MCNC: 10 MG/DL (ref 7–18)
BUN/CREAT SERPL: 13 (ref 12–20)
CALCIUM SERPL-MCNC: 10.7 MG/DL (ref 8.5–10.1)
CHLORIDE SERPL-SCNC: 109 MMOL/L (ref 100–108)
CHOLEST SERPL-MCNC: 187 MG/DL
CO2 SERPL-SCNC: 30 MMOL/L (ref 21–32)
CREAT SERPL-MCNC: 0.78 MG/DL (ref 0.6–1.3)
GLOBULIN SER CALC-MCNC: 3.8 G/DL (ref 2–4)
GLUCOSE SERPL-MCNC: 96 MG/DL (ref 74–99)
HBA1C MFR BLD: 6.4 % (ref 4.2–5.6)
HDLC SERPL-MCNC: 55 MG/DL (ref 40–60)
HDLC SERPL: 3.4 {RATIO} (ref 0–5)
LDLC SERPL CALC-MCNC: 109.4 MG/DL (ref 0–100)
LIPID PROFILE,FLP: ABNORMAL
POTASSIUM SERPL-SCNC: 4.3 MMOL/L (ref 3.5–5.5)
PROT SERPL-MCNC: 7.6 G/DL (ref 6.4–8.2)
SODIUM SERPL-SCNC: 144 MMOL/L (ref 136–145)
TRIGL SERPL-MCNC: 113 MG/DL (ref ?–150)
VLDLC SERPL CALC-MCNC: 22.6 MG/DL

## 2019-07-16 PROCEDURE — 80053 COMPREHEN METABOLIC PANEL: CPT

## 2019-07-16 PROCEDURE — 80061 LIPID PANEL: CPT

## 2019-07-16 PROCEDURE — 83036 HEMOGLOBIN GLYCOSYLATED A1C: CPT

## 2019-07-16 PROCEDURE — 36415 COLL VENOUS BLD VENIPUNCTURE: CPT

## 2019-07-16 NOTE — PROGRESS NOTES
Patient: Will Tirado MRN: [id number]  SSN: [id number]    YOB: 1965  Age: 47 y.o. Sex: female      Date of Service: 7/16/2019   Provider: GURMEET Avalos   Office Location:   [address] [place] [address]   [place], [address] [facility].  Office Phone: [phone]  Office Fax: [phone]      REASON FOR VISIT:   Chief Complaint   Patient presents with    Elevated Blood Pressure    Pelvic Pain    Fibroids    Other     DCIS        VITALS:   Visit Vitals  /80 (BP 1 Location: Left arm, BP Patient Position: Sitting)   Pulse 72   Temp 98.4 °F (36.9 °C) (Oral)   Resp 16   Ht 5' 6\" (1.676 m)   Wt 204 lb 6.4 oz (92.7 kg)   LMP 07/08/2019   SpO2 97%   BMI 32.99 kg/m²        MEDICATIONS:   Current Outpatient Medications on File Prior to Visit   Medication Sig Dispense Refill    ibuprofen (MOTRIN PO) Take  by mouth.  METHOCARBAMOL PO Take  by mouth.  loratadine (CLARITIN) 10 mg tablet Take 1 tablet by mouth daily. 90 tablet 3    esomeprazole (NEXIUM) 40 mg capsule Take 1 capsule by mouth daily. 90 capsule 1     No current facility-administered medications on file prior to visit.          ALLERGIES:   No Known Allergies     MEDICAL/SURGICAL HISTORY:  Past Medical History:   Diagnosis Date    Allergic rhinitis     Contact dermatitis and other eczema, due to unspecified cause     DCIS (ductal carcinoma in situ) of breast 12/06    Fibroids     Fibroids, uterine     Hematuria 12/2013    saw Dr. Christian Carmona    Hyperparathyroidism [facility])     Lung nodule     Microscopic hematuria     Migraines     Prediabetes     Recurrent UTI 12/2013    saw Dr. Christian Carmona, macrodantin per notes as needed    TIA (transient ischemic attack) 2012      Past Surgical History:   Procedure Laterality Date    HX BREAST RECONSTRUCTION  1/11    Bilateral    HX MASTECTOMY  3/09    Bilateral    MYOMECTOMY 1-4,W/TOT 250GMS/<,ABD APPRCH  8/05        FAMILY HISTORY:  Family History Problem Relation Age of Onset    No Known Problems Mother     Kidney Disease Father         SOCIAL HISTORY:  Social History     Tobacco Use    Smoking status: Never Smoker    Smokeless tobacco: Never Used   Substance Use Topics    Alcohol use: No    Drug use: No             HISTORY OF PRESENT ILLNESS: Ella Badillo is a 47 y.o. female who presents to the office for a routine follow up visit. She has not been seen in this office since June 2018 as she receives most of her primary care from the [place]. Breast Cancer History -   Patient reports she was diagnosed with DCIS of the R breast in 2007. She underwent 2 lumpectomies and ultimately a b/l mastectomy in 2008. She has since been following with the breast clinic at [facility] (Dr. Lali Mota and Dr. Sumaya Dorado) but has not been seen since 2016. She requested a referral to for follow up at her last visit, and one was ordered for her, but she never went. She is requesting a new referral today. Patient reports she no longer needs mammograms, but they do serial exams and sometimes ultrasounds. She does mention some acute breast discomfort b/l which she thinks is coming from her implants. Pelvic Pain -   Patient is again requesting a referral to ob/gyn. She has known fibroids in her uterus and is s/p myomectomy in 2005. She reports the fibroids have returned and are causing her some pelvic discomfort, especially deep dyspareunia with intercourse. She saw an ob/gyn at the [place], but wants a second opinion. A referral was ordered for her to go to [facility] last year, but she states she had a hard time scheduling an appointment, and ultimately never followed up. She now states she would like to try seeing a civilian provider. Pap is due. Prediabetes -   Per patient, her PCP at the [place] recently diagnosed her as prediabetic based on labs drawn in January 2019.  She is due to f/u with the [place] next month, but is wondering if she could have her labs ordered by me ahead of that appointment. She has been working on diet and exercise.      History of TIA -   Noted in patient's record. She reports she'd had some issues with slurred speech and facial drooping and it was initially thought to be a stroke but MRI was negative. She forgets exactly what the diagnosis was but will check her records. She has had no recurrent or residual symptoms. BP stable today. With regards to health maintenance, she is due for breast surveillance and pap smear. Colonoscopy was done last year and was WNL per patient. Will be requesting records from the [place]. REVIEW OF SYSTEMS:  Review of Systems   Constitutional: Negative for chills, fever and malaise/fatigue. Respiratory: Negative for cough, shortness of breath and wheezing. Cardiovascular: Negative for chest pain, palpitations and leg swelling. Gastrointestinal: Negative for abdominal pain, nausea and vomiting. PHYSICAL EXAMINATION:  Physical Exam   Constitutional: She is oriented to person, place, and time and well-developed, well-nourished, and in no distress. Cardiovascular: Normal rate, regular rhythm and normal heart sounds. Exam reveals no gallop and no friction rub. No murmur heard. Pulmonary/Chest: Effort normal and breath sounds normal. She has no wheezes. She has no rales. Neurological: She is alert and oriented to person, place, and time. Gait normal.   Skin: Skin is warm and dry. No rash noted. Psychiatric: Mood, memory and affect normal.        RESULTS:  No results found for this visit on 07/16/19. ASSESSMENT/PLAN:  Diagnoses and all orders for this visit:    1. Uterine leiomyoma, unspecified location  2. Pelvic pain  - New referral ordered to ob/gyn. Stressed importance of follow up. Patient was advised to keep us abreast of any scheduling issues that she may run into as we may be able to assist her  Orders:    -     REFERRAL TO OBSTETRICS AND GYNECOLOGY    3.  Ductal carcinoma in situ (DCIS) of right breast - Referred back to breast surgery team at [facility]. Stressed importance of close follow up with patient. Again, if she is having difficulty getting an appointment, she should let us know to avoid further gaps in her care  Orders:    -     REFERRAL TO BREAST SURGERY    4. Prediabetes  - Fasting labs ordered  Orders:    -     METABOLIC PANEL, COMPREHENSIVE; Future  -     LIPID PANEL; Future  -     HEMOGLOBIN A1C W/O EAG; Future      [facility]. All questions answered. Patient expresses understanding and agrees with the plan as detailed above.     PATIENT CARE TEAM:   Patient Care Team:  Jami Denny MD as PCP - FAMILY PRACTICE  Jarett England MD (Urology)  Jasmin Greene MD (Gynecology)       GURMEET Tadeo   July 16, 2019   8:52 AM

## 2019-07-16 NOTE — PROGRESS NOTES
1. Have you been to the ER, urgent care clinic since your last visit? Hospitalized since your last visit? No    2. Have you seen or consulted any other health care providers outside of the [address] [place] [address] since your last visit? Include any pap smears or colon screening. PT for back pain LOV: completed three weeks ago - [facility]. Colonoscopy was completed at the [facility] 5/22/19.     Chief Complaint   Patient presents with    Elevated Blood Pressure    Pelvic Pain    Fibroids    Other     DCIS

## 2019-07-17 NOTE — PROGRESS NOTES
Please advise patient that her A1c was elevated at 6.4%. This is consistent with her diagnosis of diabetes, but is dangerously close to a new diagnosis of Type 2 DM. (cutoff is 6.5%)   I will send a letter with her lab results for her to take to her primary PCP at the [place] who has been managing this condition. In the meantime, would recommend working on weight loss through diet and exercise, specifically cutting back on carbs and focusing on lean protein and non-starchy vegetables. Labs look ok otherwise.

## 2019-07-18 ENCOUNTER — TELEPHONE (OUTPATIENT)
Dept: FAMILY MEDICINE CLINIC | Age: 54
End: 2019-07-18

## 2019-07-18 NOTE — PROGRESS NOTES
Patient identifiers verified. Patient advised that  her A1c was elevated at 6.4%. This is consistent with her diagnosis of diabetes, but is dangerously close to a new diagnosis of Type 2 DM. (cutoff is 6.5%)   I will send a letter with her lab results for her to take to her primary PCP at the [place] who has been managing this condition. In the meantime, would recommend working on weight loss through diet and exercise, specifically cutting back on carbs and focusing on lean protein and non-starchy vegetables. Labs look ok otherwise. Patient voices understanding.

## 2019-07-18 NOTE — PROGRESS NOTES
Kristopher Huggins [phone] ([place]) for patient to call [facility].

## 2020-01-27 ENCOUNTER — OFFICE VISIT (OUTPATIENT)
Dept: FAMILY MEDICINE CLINIC | Age: 55
End: 2020-01-27

## 2020-01-27 VITALS
TEMPERATURE: 98.2 F | HEIGHT: 66 IN | RESPIRATION RATE: 16 BRPM | SYSTOLIC BLOOD PRESSURE: 130 MMHG | BODY MASS INDEX: 32.14 KG/M2 | OXYGEN SATURATION: 96 % | HEART RATE: 78 BPM | DIASTOLIC BLOOD PRESSURE: 74 MMHG | WEIGHT: 200 LBS

## 2020-01-27 DIAGNOSIS — D05.11 DUCTAL CARCINOMA IN SITU (DCIS) OF RIGHT BREAST: ICD-10-CM

## 2020-01-27 DIAGNOSIS — R73.03 PREDIABETES: Primary | ICD-10-CM

## 2020-01-27 DIAGNOSIS — D25.9 UTERINE LEIOMYOMA, UNSPECIFIED LOCATION: ICD-10-CM

## 2020-01-27 DIAGNOSIS — R10.2 PELVIC PAIN: ICD-10-CM

## 2020-01-27 RX ORDER — METFORMIN HYDROCHLORIDE 500 MG/1
500 TABLET, EXTENDED RELEASE ORAL
Qty: 90 TAB | Refills: 0 | Status: SHIPPED | OUTPATIENT
Start: 2020-01-27 | End: 2020-04-27 | Stop reason: SDUPTHER

## 2020-01-27 NOTE — PATIENT INSTRUCTIONS

## 2020-01-27 NOTE — PROGRESS NOTES
1. Have you been to the ER, urgent care clinic since your last visit? Hospitalized since your last visit? No    2. Have you seen or consulted any other health care providers outside of the [address] [place] [address] since your last visit? Include any pap smears or colon screening.  No

## 2020-01-27 NOTE — PROGRESS NOTES
Patient: Trino Rico MRN: [id number]  SSN: [id number]    YOB: 1965  Age: 49 y.o. Sex: female      Date of Service: 1/27/2020   Provider: GURMEET Harmon   Office Location:   [facility], [address] [place], [address] [facility] [address] [facility] [address]  Office Phone: [phone]  Office Fax: [phone]      REASON FOR VISIT:   No chief complaint on file. VITALS:   Visit Vitals  /74 (BP 1 Location: Left arm, BP Patient Position: Sitting)   Pulse 78   Temp 98.2 °F (36.8 °C) (Oral)   Resp 16   Ht 5' 6\" (1.676 m)   Wt 200 lb (90.7 kg)   SpO2 96%   BMI 32.28 kg/m²        MEDICATIONS:   Current Outpatient Medications on File Prior to Visit   Medication Sig Dispense Refill    esomeprazole (NEXIUM) 40 mg capsule Take 1 capsule by mouth daily. 90 capsule 1    ibuprofen (MOTRIN PO) Take  by mouth.  METHOCARBAMOL PO Take  by mouth.  loratadine (CLARITIN) 10 mg tablet Take 1 tablet by mouth daily. 90 tablet 3     No current facility-administered medications on file prior to visit.          ALLERGIES:   No Known Allergies     MEDICAL/SURGICAL HISTORY:  Past Medical History:   Diagnosis Date    Allergic rhinitis     Contact dermatitis and other eczema, due to unspecified cause     DCIS (ductal carcinoma in situ) of breast 12/06    Fibroids     Fibroids, uterine     Hematuria 12/2013    saw Dr. Rosezena Meckel    Hyperparathyroidism [facility])     Lung nodule     Microscopic hematuria     Migraines     Prediabetes     Recurrent UTI 12/2013    saw Dr. Rosezena Meckel, macrodantin per notes as needed    TIA (transient ischemic attack) 2012      Past Surgical History:   Procedure Laterality Date    HX BREAST RECONSTRUCTION  1/11    Bilateral    HX MASTECTOMY  3/09    Bilateral    MYOMECTOMY 1-4,W/TOT 250GMS/<,ABD APPRCH  8/05        FAMILY HISTORY:  Family History   Problem Relation Age of Onset    No Known Problems Mother     Kidney Disease Father         SOCIAL HISTORY:  Social History     Tobacco Use    Smoking status: Never Smoker    Smokeless tobacco: Never Used   Substance Use Topics    Alcohol use: No    Drug use: No             HISTORY OF PRESENT ILLNESS: Ariane Duran is a 49 y.o. female who presents to the office for a routine follow up visit. Prediabetes -   Last A1c 6.4% in July 2019. Patient had told me that she was going to follow up with her PCP at the [place] about this, but admits she had to reschedule her appointment. She has not made any significant changes to her diet, which is somewhat high in carbohydrates. Family hx is +for DM in mother and maternal grandmother. Hx Breast CA -   Patient was again referred back to her specialists at [facility] for surveillance, but reports she had difficulty scheduling a follow up, and then ultimately forgot. Pelvic Pain -   Patient was referred to ob/gyn in July. Admits she never scheduled an appointment. She is noted to be overdue for a pap smear, mammogram, etc.       REVIEW OF SYSTEMS:  Review of Systems   Constitutional: Negative for chills and fever. Respiratory: Negative for cough, shortness of breath and wheezing. Cardiovascular: Negative for chest pain, palpitations and leg swelling. PHYSICAL EXAMINATION:  Physical Exam  Cardiovascular:      Rate and Rhythm: Normal rate and regular rhythm. Heart sounds: Normal heart sounds. No murmur. No friction rub. No gallop. Pulmonary:      Effort: Pulmonary effort is normal.      Breath sounds: Normal breath sounds. No wheezing or rales. Skin:     General: Skin is warm and dry. Findings: No rash. Neurological:      Mental Status: She is alert and oriented to person, place, and time. Gait: Gait is intact.    Psychiatric:         Mood and Affect: Mood and affect normal.         Cognition and Memory: Memory normal.          RESULTS:  Lab Results   Component Value Date/Time    Sodium 144 07/16/2019 09:01 AM    Potassium 4.3 07/16/2019 09:01 AM    Chloride 109 (H) 07/16/2019 09:01 AM    CO2 30 07/16/2019 09:01 AM    Anion gap 5 07/16/2019 09:01 AM    Glucose 96 07/16/2019 09:01 AM    BUN 10 07/16/2019 09:01 AM    Creatinine 0.78 07/16/2019 09:01 AM    BUN/Creatinine ratio 13 07/16/2019 09:01 AM    GFR est AA >60 07/16/2019 09:01 AM    GFR est non-AA >60 07/16/2019 09:01 AM    Calcium 10.7 (H) 07/16/2019 09:01 AM    Bilirubin, total 0.4 07/16/2019 09:01 AM    AST (SGOT) 21 07/16/2019 09:01 AM    Alk. phosphatase 158 (H) 07/16/2019 09:01 AM    Protein, total 7.6 07/16/2019 09:01 AM    Albumin 3.8 07/16/2019 09:01 AM    Globulin 3.8 07/16/2019 09:01 AM    A-G Ratio 1.0 07/16/2019 09:01 AM    ALT (SGPT) 43 07/16/2019 09:01 AM      Lab Results   Component Value Date/Time    Cholesterol, total 187 07/16/2019 09:01 AM    HDL Cholesterol 55 07/16/2019 09:01 AM    LDL, calculated 109.4 (H) 07/16/2019 09:01 AM    VLDL, calculated 22.6 07/16/2019 09:01 AM    Triglyceride 113 07/16/2019 09:01 AM    CHOL/HDL Ratio 3.4 07/16/2019 09:01 AM      Lab Results   Component Value Date/Time    Hemoglobin A1c 6.4 (H) 07/16/2019 09:01 AM    Hemoglobin A1c (POC) 5.9 03/18/2013 09:45 AM         ASSESSMENT/PLAN:  Diagnoses and all orders for this visit:    1. Prediabetes  - Counseled extensively on diet and exercise  - Start low dose metformin as below  - will re check A1c in 3 mo  Orders:    -     metFORMIN ER (GLUCOPHAGE XR) 500 mg tablet; Take 1 Tab by mouth daily (with dinner). 2. Uterine leiomyoma, unspecified location  3. Pelvic pain  - Patient advised to follow up with ob/gyn. Referral was authorized. 4. Ductal carcinoma in situ (DCIS) of right breast  - Patient to schedule follow up with her breast cancer team at [facility].  She was advised that if we can be of any further assistance with her referral, to let us know, but explained to her that this responsibility falls on her, and that it is very important to follow up to monitor for recurrence          Follow-up and Dispositions    · Return in about 3 months (around 4/27/2020) for routine care POC A1C. All questions answered. Patient expresses understanding and agrees with the plan as detailed above.     PATIENT CARE TEAM:   Patient Care Team:  GURMEET Rodriguez as PCP - General (Physician Assistant)  Jami Denny MD as PCP - [address] [place] [address] GURMEET Richardson as PCP - [facility]  Norma Trimble MD (Urology)  Tasha Leavitt MD (Gynecology)       GURMEET Brooks   January 27, 2020   5:08 PM

## 2020-04-27 ENCOUNTER — VIRTUAL VISIT (OUTPATIENT)
Dept: FAMILY MEDICINE CLINIC | Age: 55
End: 2020-04-27

## 2020-04-27 DIAGNOSIS — Z86.000 HISTORY OF DUCTAL CARCINOMA IN SITU (DCIS) OF BREAST: ICD-10-CM

## 2020-04-27 DIAGNOSIS — R73.03 PREDIABETES: Primary | ICD-10-CM

## 2020-04-27 RX ORDER — METFORMIN HYDROCHLORIDE 500 MG/1
500 TABLET, EXTENDED RELEASE ORAL
Qty: 90 TAB | Refills: 0 | Status: SHIPPED | OUTPATIENT
Start: 2020-04-27 | End: 2020-10-15

## 2020-04-27 NOTE — PROGRESS NOTES
Shannon Frost is a 49 y.o. female who was seen by synchronous (real-time) audio-video technology on 4/27/2020. Consent: Shannon Frost, who was seen by synchronous (real-time) audio-video technology, and/or her healthcare decision maker, is aware that this patient-initiated, Telehealth encounter on 4/27/2020 is a billable service, with coverage as determined by her insurance carrier. She is aware that she may receive a bill and has provided verbal consent to proceed: Yes. Assessment & Plan:   Diagnoses and all orders for this visit:    1. Prediabetes  - Doing well on metformin  - Continue to work on weight loss thorough diet & exercise  - Check BMP and A1c at patient's earliest convenience, no later than a few days prior to her next scheduled follow up   Orders:    -     HEMOGLOBIN A1C W/O EAG; Future  -     METABOLIC PANEL, BASIC; Future  -     metFORMIN ER (GLUCOPHAGE XR) 500 mg tablet; Take 1 Tab by mouth daily (with dinner). 2. History of ductal carcinoma in situ (DCIS) of breast  - Patient was again advised to f/u with her breast surgeon and gynecologist ASAP. She will be transferring her care to Dr. Zulay Jade going forward, and I explained that he does not perform routine gyn care. Follow-up and Dispositions    · Return in about 3 months (around 7/27/2020) for routine care with Dr. Zulay Jade (non-fasting labs due asa). I spent at least 15 minutes on this visit with this established patient. [id number]  Subjective: Shannon Frost is a 49 y.o. female who was seen for Other (prediabetes)      Prior to Admission medications    Medication Sig Start Date End Date Taking? Authorizing Provider   metFORMIN ER (GLUCOPHAGE XR) 500 mg tablet Take 1 Tab by mouth daily (with dinner). 4/27/20  Yes Marine Ventura PA   metFORMIN ER (GLUCOPHAGE XR) 500 mg tablet Take 1 Tab by mouth daily (with dinner). 1/27/20 4/27/20  Sridhar Ventura PA   ibuprofen (MOTRIN PO) Take  by mouth.     Provider, Historical METHOCARBAMOL PO Take  by mouth. Provider, Historical   loratadine (CLARITIN) 10 mg tablet Take 1 tablet by mouth daily. 11/6/14   Sylvester Warner MD   esomeprazole (NEXIUM) 40 mg capsule Take 1 capsule by mouth daily. 11/6/14   Sylvester Warner MD     No Known Allergies    Prediabetes -   Patient recently started metformin after routine labs revealed an A1c of 6.4%. We had planned to repeat a POC A1c at today's visit. Patient reports she is tolerating the medication well. Had some mild nausea initially, which has since resolved. She has also been working on following a low carb diet and states she has lost about 10 lb.     Hx Breast CA -   Patient was again referred back to her specialists at [facility] for surveillance, but admits she still has not scheduled a follow up, explaining that her mother passed away since her last routine visit with me.     Pelvic Pain -   Ongoing, intermittent. Patient was referred to ob/gyn in July of last year. Admits she never scheduled an appointment. She is also overdue for a pap smear and routine gynecologic care. Review of Systems   Constitutional: Negative for chills and fever. Respiratory: Negative for cough, shortness of breath and wheezing. Cardiovascular: Negative for chest pain. Gastrointestinal: Negative for abdominal pain, diarrhea and nausea. Objective: There were no vitals taken for this visit. General: alert, cooperative, no distress   Mental  status: normal mood, behavior, speech, dress, motor activity, and thought processes, able to follow commands   HENT: NCAT   Neck: no visualized mass   Resp: no respiratory distress   Neuro: no gross deficits   Skin: no discoloration or lesions of concern on visible areas   Psychiatric: normal affect, consistent with stated mood, no evidence of hallucinations       We discussed the expected course, resolution and complications of the diagnosis(es) in detail.   Medication risks, benefits, costs, interactions, and alternatives were discussed as indicated. I advised her to contact the office if her condition worsens, changes or fails to improve as anticipated. She expressed understanding with the diagnosis(es) and plan. Christopher Abernathy is a 49 y.o. female who was evaluated by a video visit encounter for concerns as above. Patient identification was verified prior to start of the visit. A caregiver was present when appropriate. Due to this being a TeleHealth encounter ([facility]), evaluation of the following organ systems was limited: Vitals/Constitutional/EENT/Resp/CV/GI//MS/Neuro/Skin/Heme-Lymph-Imm. Pursuant to the emergency declaration under the [facility], 1135 waiver authority and the [employer] Act, this Virtual  Visit was conducted, with patient's (and/or legal guardian's) consent, to reduce the patient's risk of exposure to COVID-19 and provide necessary medical care. Services were provided through a video synchronous discussion virtually to substitute for in-person clinic visit. Patient and provider were located at their individual homes.       GURMEET Armenta  4/27/2020  2:23 PM

## 2020-10-15 ENCOUNTER — VIRTUAL VISIT (OUTPATIENT)
Dept: FAMILY MEDICINE CLINIC | Age: 55
End: 2020-10-15
Payer: OTHER GOVERNMENT

## 2020-10-15 DIAGNOSIS — K21.9 GASTROESOPHAGEAL REFLUX DISEASE, UNSPECIFIED WHETHER ESOPHAGITIS PRESENT: ICD-10-CM

## 2020-10-15 DIAGNOSIS — R73.03 PREDIABETES: Primary | ICD-10-CM

## 2020-10-15 DIAGNOSIS — E21.3 HYPERPARATHYROIDISM (HCC): ICD-10-CM

## 2020-10-15 DIAGNOSIS — Z86.000 HISTORY OF DUCTAL CARCINOMA IN SITU (DCIS) OF BREAST: ICD-10-CM

## 2020-10-15 DIAGNOSIS — J30.9 ALLERGIC RHINITIS, UNSPECIFIED SEASONALITY, UNSPECIFIED TRIGGER: ICD-10-CM

## 2020-10-15 DIAGNOSIS — D25.9 UTERINE LEIOMYOMA, UNSPECIFIED LOCATION: ICD-10-CM

## 2020-10-15 PROCEDURE — 99213 OFFICE O/P EST LOW 20 MIN: CPT | Performed by: FAMILY MEDICINE

## 2020-10-15 NOTE — PATIENT INSTRUCTIONS
A Healthy Lifestyle: Care Instructions  Your Care Instructions     A healthy lifestyle can help you feel good, stay at a healthy weight, and have plenty of energy for both work and play. A healthy lifestyle is something you can share with your whole family. A healthy lifestyle also can lower your risk for serious health problems, such as high blood pressure, heart disease, and diabetes. You can follow a few steps listed below to improve your health and the health of your family. Follow-up care is a key part of your treatment and safety. Be sure to make and go to all appointments, and call your doctor if you are having problems. It's also a good idea to know your test results and keep a list of the medicines you take. How can you care for yourself at home? · Do not eat too much sugar, fat, or fast foods. You can still have dessert and treats now and then. The goal is moderation. · Start small to improve your eating habits. Pay attention to portion sizes, drink less juice and soda pop, and eat more fruits and vegetables. ? Eat a healthy amount of food. A 3-ounce serving of meat, for example, is about the size of a deck of cards. Fill the rest of your plate with vegetables and whole grains. ? Limit the amount of soda and sports drinks you have every day. Drink more water when you are thirsty. ? Eat at least 5 servings of fruits and vegetables every day. It may seem like a lot, but it is not hard to reach this goal. A serving or helping is 1 piece of fruit, 1 cup of vegetables, or 2 cups of leafy, raw vegetables. Have an apple or some carrot sticks as an afternoon snack instead of a candy bar. Try to have fruits and/or vegetables at every meal.  · Make exercise part of your daily routine. You may want to start with simple activities, such as walking, bicycling, or slow swimming. Try to be active 30 to 60 minutes every day. You do not need to do all 30 to 60 minutes all at once.  For example, you can exercise 3 times a day for 10 or 20 minutes. Moderate exercise is safe for most people, but it is always a good idea to talk to your doctor before starting an exercise program.  · Keep moving. Kelly Houston the lawn, work in the garden, or Mogi. Take the stairs instead of the elevator at work. · If you smoke, quit. People who smoke have an increased risk for heart attack, stroke, cancer, and other lung illnesses. Quitting is hard, but there are ways to boost your chance of quitting tobacco for good. ? Use nicotine gum, patches, or lozenges. ? Ask your doctor about stop-smoking programs and medicines. ? Keep trying. In addition to reducing your risk of diseases in the future, you will notice some benefits soon after you stop using tobacco. If you have shortness of breath or asthma symptoms, they will likely get better within a few weeks after you quit. · Limit how much alcohol you drink. Moderate amounts of alcohol (up to 2 drinks a day for men, 1 drink a day for women) are okay. But drinking too much can lead to liver problems, high blood pressure, and other health problems. Family health  If you have a family, there are many things you can do together to improve your health. · Eat meals together as a family as often as possible. · Eat healthy foods. This includes fruits, vegetables, lean meats and dairy, and whole grains. · Include your family in your fitness plan. Most people think of activities such as jogging or tennis as the way to fitness, but there are many ways you and your family can be more active. Anything that makes you breathe hard and gets your heart pumping is exercise. Here are some tips:  ? Walk to do errands or to take your child to school or the bus.  ? Go for a family bike ride after dinner instead of watching TV. Where can you learn more?   Go to http://www.gray.com/  Enter Y233 in the search box to learn more about \"A Healthy Lifestyle: Care Instructions. \"  Current as of: January 31, 2020               Content Version: 12.6  © 0391-5235 [employer]. Care instructions adapted under license by [employer] (which disclaims liability or warranty for this information). If you have questions about a medical condition or this instruction, always ask your healthcare professional. [place] [address] any warranty or liability for your use of this information.

## 2020-10-15 NOTE — PROGRESS NOTES
1. Have you been to the ER, urgent care clinic since your last visit? Hospitalized since your last visit? No    2. Have you seen or consulted any other health care providers outside of the [address] [place] [address] since your last visit? Include any pap smears or colon screening.  No

## 2020-10-16 ENCOUNTER — DOCUMENTATION ONLY (OUTPATIENT)
Dept: FAMILY MEDICINE CLINIC | Age: 55
End: 2020-10-16

## 2020-10-16 NOTE — PROGRESS NOTES
Spoke with [facility]. There is no record of patient of patient being seen by Dr. Adi Zhang at [facility].

## 2020-10-18 NOTE — PROGRESS NOTES
Minesh Courtney, who was evaluated through a synchronous (real-time) audio-video encounter, and/or her healthcare decision maker, is aware that it is a billable service, with coverage as determined by her insurance carrier. She provided verbal consent to proceed: Yes, and patient identification was verified. It was conducted pursuant to the emergency declaration under the [facility], [address] [place] [address] authority and the [employer] Act. A caregiver was present when appropriate. Ability to conduct physical exam was limited. I was in the office. The patient was at home. SUBJECTIVE  Chief Complaint   Patient presents with    Referral Request     OB/GYN    GERD     Patient presents to establish care. She also sees the [place]. No active complaints but has a lot of care gaps according to my chart review that needs looked into. She has a history of breast cancer and uterine fibroids. She was referred to care for these issues and has not followed through. She has prediabetes. She is well overdue for labs.      Past Medical History:   Diagnosis Date    Allergic rhinitis     Contact dermatitis and other eczema, due to unspecified cause     DCIS (ductal carcinoma in situ) of breast 12/06    Fibroids, uterine     GERD (gastroesophageal reflux disease)     Hematuria 12/2013    saw Dr. Tammy Barney    Hyperparathyroidism [facility])     Lung nodule     Microscopic hematuria     Migraines     Prediabetes     Recurrent UTI 12/2013    saw Dr. Tammy Barney, macrodantin per notes as needed    TIA (transient ischemic attack) 2012     Past Surgical History:   Procedure Laterality Date    HX BREAST RECONSTRUCTION  1/11    Bilateral    HX MASTECTOMY  3/09    Bilateral    MYOMECTOMY 1-4,W/TOT 250GMS/<,ABD [facility]  8/05       Current Outpatient Medications:     ibuprofen (MOTRIN PO), Take  by mouth., Disp: , Rfl:     METHOCARBAMOL PO, Take  by mouth., Disp: , Rfl:     loratadine (CLARITIN) 10 mg tablet, Take 1 tablet by mouth daily. , Disp: 90 tablet, Rfl: 3    esomeprazole (NEXIUM) 40 mg capsule, Take 1 capsule by mouth daily. , Disp: 90 capsule, Rfl: 1    No Known Allergies    Social History     Tobacco Use    Smoking status: Never Smoker    Smokeless tobacco: Never Used   Substance Use Topics    Alcohol use: No    Drug use: No     Family History   Problem Relation Age of Onset    Diabetes Mother     Kidney Disease Father     Diabetes Maternal Grandmother      ROS:  History obtained from the patient   · Cardiovascular: no chest pain or dyspnea on exertion  · Gastrointestinal: chronic GERD on PPIs. Reports has had a colonoscopy at the [place]. OBJECTIVE  General:  Alert, cooperative, well appearing, in no apparent distress. Psych: normal affect. Mood good. Oriented x 3. Judgement and insight intact. ASSESSMENT / PLAN      ICD-10-CM ICD-9-CM    1. Prediabetes  R73.03 790.29 CBC WITH AUTOMATED DIFF      METABOLIC PANEL, COMPREHENSIVE      LIPID PANEL      HEMOGLOBIN A1C WITH EAG   2. History of ductal carcinoma in situ (DCIS) of breast  Z86.000 V13.89 REFERRAL TO BREAST SURGERY   3. Uterine leiomyoma, unspecified location  D25.9 218.9 REFERRAL TO OBSTETRICS AND GYNECOLOGY   4. Allergic rhinitis, unspecified seasonality, unspecified trigger  J30.9 477.9    5. Hyper[facility] ([facility]  E21.3 252.00 VITAMIN D, 25 HYDROXY      PTH INTACT   6. Gastroesophageal reflux disease, unspecified whether esophagitis present  K21.9 530.81      Prediabetes  - Chart indicates she used to be on metformin  - Continue to work on weight loss thorough diet & exercise  - Check labs as she is overdue.      History of ductal carcinoma in situ (DCIS) of breast  History of uterine fibroids   - Patient was again advised to f/u with her breast surgeon and gynecologist ASAP.   Referrals and placed and I asked her to reach out to us with any difficulties so we can assist in care management. Her history indicates she will need support to stay on track. Allergic rhinitis - cont OTC meds as needed. Hyperparathyroidism - says she sees endocrinology at the [place]. Labs ordered. Cont current care per specialist.     GERD - cont PPIs. Advised to get us her colonoscopy report from the [place]. All chart history elements were reviewed by me at the time of the visit even though marked at time of note closure. Patient understands our medical plan. Patient has provided input and agrees with goals. Alternatives have been explained and offered. All questions answered. The patient is to call if condition worsens or fails to improve.

## 2022-02-10 ENCOUNTER — DOCUMENTATION ONLY (OUTPATIENT)
Dept: FAMILY MEDICINE CLINIC | Age: 57
End: 2022-02-10

## 2022-03-19 PROBLEM — E21.3 HYPERPARATHYROIDISM (HCC): Status: ACTIVE | Noted: 2018-06-21

## 2022-10-27 ENCOUNTER — OFFICE VISIT (OUTPATIENT)
Dept: FAMILY MEDICINE CLINIC | Age: 57
End: 2022-10-27
Payer: OTHER GOVERNMENT

## 2022-10-27 VITALS
HEART RATE: 84 BPM | WEIGHT: 196.8 LBS | OXYGEN SATURATION: 100 % | SYSTOLIC BLOOD PRESSURE: 148 MMHG | TEMPERATURE: 97.8 F | BODY MASS INDEX: 31.63 KG/M2 | DIASTOLIC BLOOD PRESSURE: 77 MMHG | HEIGHT: 66 IN | RESPIRATION RATE: 16 BRPM

## 2022-10-27 DIAGNOSIS — G89.29 CHRONIC BILATERAL LOW BACK PAIN WITH BILATERAL SCIATICA: ICD-10-CM

## 2022-10-27 DIAGNOSIS — Z76.89 ENCOUNTER TO ESTABLISH CARE: ICD-10-CM

## 2022-10-27 DIAGNOSIS — M79.18 MYOFASCIAL PAIN: ICD-10-CM

## 2022-10-27 DIAGNOSIS — Z86.39 HISTORY OF HYPERPARATHYROIDISM: ICD-10-CM

## 2022-10-27 DIAGNOSIS — M54.42 CHRONIC BILATERAL LOW BACK PAIN WITH BILATERAL SCIATICA: ICD-10-CM

## 2022-10-27 DIAGNOSIS — Z11.59 NEED FOR HEPATITIS C SCREENING TEST: Primary | ICD-10-CM

## 2022-10-27 DIAGNOSIS — R03.0 ELEVATED BLOOD PRESSURE READING: ICD-10-CM

## 2022-10-27 DIAGNOSIS — M54.41 CHRONIC BILATERAL LOW BACK PAIN WITH BILATERAL SCIATICA: ICD-10-CM

## 2022-10-27 DIAGNOSIS — R91.8 LUNG NODULES: ICD-10-CM

## 2022-10-27 PROCEDURE — 99204 OFFICE O/P NEW MOD 45 MIN: CPT | Performed by: STUDENT IN AN ORGANIZED HEALTH CARE EDUCATION/TRAINING PROGRAM

## 2022-10-27 RX ORDER — DULOXETIN HYDROCHLORIDE 30 MG/1
30 CAPSULE, DELAYED RELEASE ORAL 2 TIMES DAILY
Qty: 60 CAPSULE | Refills: 1 | Status: SHIPPED | OUTPATIENT
Start: 2022-10-27

## 2022-10-27 NOTE — PROGRESS NOTES
Felton Eastman is a 65 y.o.  female and presents with    Chief Complaint   Patient presents with   [facility] Establish Care    Back Pain     6/10 lower back, chronic since 2019. Subjective:    Pt is here to establish care    Found breast cancer in 2006. She had a double mastectomy in 2009, and had reconstruction surgery 2010. All of her breast care has been followed by [employer]. 10/5 had aisha in breat clinic. In 2021 had MRI of breast after experiencing tightness on the L breast implant. She is going to have replacement of both breast implants, and clean up they are looking sometime in next year. Back pain - has been having issues w/ this since she was in the [employer]. Has been progressively getting worse, mostly in [id number]. She has hx of DDD. She has episodes where she has had to go to ED, d/t the pain, bc she would not even be able to sit in the car d/t her back. She was given flexeril, and NSAIDS for pain. She feels like she has bone on bone pain. She could not sit up or lay down, or stand up straight. She has \"flare ups\" of back pain. When she has this . Has had PT several times for her back. It is painful to even sit on the toilet. She does better on the Methocarbamol then the flexeril d/t how it makes her feel. D/t her back pain she cannot even sleep. 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 Had blood work. Was prediabetic. Was told in the past she was doing better. Was told she had extra calcium and needed to have extra blood work. CT scan in 2017 showed a nodule on the RLL. She just had a CT in 2022 and showed calcification as a possible granuloma. Used to f/up w/ endocrinologist for hyperthyroidism. No medicines she was taken for this.    Patient Active Problem List   Diagnosis Code    Migraines Z89.312    DCIS (ductal carcinoma in situ) of breast D05.10    Allergic rhinitis J30.9    GERD (gastroesophageal reflux disease) K21.9    Microscopic hematuria R31.29    [facility]parathyroidism ([facility] E21.3      Past Medical History:   Diagnosis Date    Allergic rhinitis     Contact dermatitis and other eczema, due to unspecified cause     DCIS (ductal carcinoma in situ) of breast 12/06    Fibroids, uterine     GERD (gastroesophageal reflux disease)     Hematuria 12/2013    saw Dr. Samreen Harkins    Hyperparathyroidism [facility])     Lung nodule     Microscopic hematuria     Migraines     Prediabetes     Recurrent UTI 12/2013    saw Dr. Samreen Harkins, macrodantin per notes as needed    TIA (transient ischemic attack) 2012      Past Surgical History:   Procedure Laterality Date    HX BREAST RECONSTRUCTION  1/11    Bilateral    HX MASTECTOMY Bilateral 03/2009    Bilateral    SD MYOMECTOMY 1-4,W/TOT 250GMS/<,ABD [facility]  8/05      Family History   Problem Relation Age of Onset    Diabetes Mother     Kidney Disease Father     Diabetes Maternal Grandmother      Social History     Socioeconomic History    Marital status:      Spouse name: Not on file    Number of children: Not on file    Years of education: Not on file    Highest education level: Not on file   Occupational History    Occupation: retired [employer]   Tobacco Use    Smoking status: Never    Smokeless tobacco: Never   Substance and Sexual Activity    Alcohol use: No    Drug use: No    Sexual activity: Yes     Partners: Male     Birth control/protection: Condom   Other Topics Concern    Not on file   Social History Narrative    Not on file     Social Determinants of Health     Financial Resource Strain: Not on file   Food Insecurity: Not on file   Transportation Needs: Not on file   Physical Activity: Unknown    Days of Exercise per Week: Patient refused    Minutes of Exercise per Session: Not on file   Stress: Not on file   Social Connections: Not on file   Intimate Partner Violence: Not At Risk    Fear of Current or Ex-Partner: No    Emotionally Abused: No    Physically Abused: No    Sexually Abused: No   Housing Stability: Not on file        Current Outpatient Medications   Medication Sig Dispense Refill    ibuprofen (MOTRIN PO) Take  by mouth.  METHOCARBAMOL PO Take  by mouth.  loratadine (CLARITIN) 10 mg tablet Take 1 tablet by mouth daily. 90 tablet 3    esomeprazole (NEXIUM) 40 mg capsule Take 1 capsule by mouth daily. 90 capsule 1        ROS   Review of Systems   Constitutional:  Negative for chills, fever and malaise/fatigue. HENT:  Negative for congestion, ear discharge and ear pain. Eyes:  Negative for blurred vision, pain and discharge. Respiratory:  Negative for cough and shortness of breath. Cardiovascular:  Negative for chest pain and palpitations. Gastrointestinal:  Negative for abdominal pain, nausea and vomiting. Genitourinary:  Negative for dysuria, frequency and urgency. Skin:  Negative for itching and rash. Neurological:  Negative for dizziness, seizures, loss of consciousness and headaches. Psychiatric/Behavioral:  Negative for substance abuse. Objective:  Vitals:    10/27/22 0956 10/27/22 1010   BP: (!) 158/85 (!) 148/77   Pulse: 84    Resp: 16    Temp: 97.8 °F (36.6 °C)    TempSrc: Temporal    SpO2: 100%    Weight: 196 lb 12.8 oz (89.3 kg)    Height: 5' 6.14\" (1.68 m)    PainSc:   6    PainLoc: Back        Physical Exam  Constitutional:       Appearance: Normal appearance. Eyes:      General: No scleral icterus. Right eye: No discharge. Left eye: No discharge. Cardiovascular:      Rate and Rhythm: Normal rate and regular rhythm. Pulmonary:      Effort: Pulmonary effort is normal. No respiratory distress. Breath sounds: Normal breath sounds. Musculoskeletal:      Cervical back: Normal range of motion and neck supple. Comments: Muscle tautness in trapezius b/l. Neurological:      Mental Status: She is alert and oriented to person, place, and time. Cranial Nerves: No cranial nerve deficit. Psychiatric:         Mood and Affect: Mood normal.         Behavior: Behavior normal.         Thought Content: Thought content normal.         Judgment: Judgment normal.       LABS     TESTS      Assessment/Plan:      1. Need for hepatitis C screening test  - HEPATITIS C AB; Future    2. Chronic bilateral low back pain with bilateral sciatica  Will try cymbalta if it does not work we can try gabapentin  - DULoxetine (CYMBALTA) 30 mg capsule; Take 1 Capsule by mouth two (2) times a day. Dispense: 60 Capsule; Refill: 1    3. Myofascial pain  TENS unit ordered for pt. - DULoxetine (CYMBALTA) 30 mg capsule; Take 1 Capsule by mouth two (2) times a day. Dispense: 60 Capsule; Refill: 1    4. Lung nodules  [facility]    5. History of hyperparathyroidism    6. Elevated blood pressure reading  RTC for BP check    7. Encounter to establish care  Will be pt PCP           Lab review: Pt will bring copy of the labs done at the [place]      I have discussed the diagnosis with the patient and the intended plan as seen in the above orders. The patient has received an after-visit summary and questions were answered concerning future plans. I have discussed medication side effects and warnings with the patient as well. I have reviewed the plan of care with the patient, accepted their input and they are in agreement with the treatment goals.          Esteban Cunningham MD

## 2022-10-27 NOTE — PROGRESS NOTES
Yara Smith is a 65 y.o. presents today for   Chief Complaint   Patient presents with    Establish Care    Back Pain     6/10 lower back, chronic since 2019. Is someone accompanying this pt? Yes,     Is the patient using any DME equipment during [address]? No    Visit Vitals  BP (!) 158/85 (BP 1 Location: Left upper arm, BP Patient Position: Sitting, BP Cuff Size: Adult)   Pulse 84   Temp 97.8 °F (36.6 °C) (Temporal)   Resp 16   Ht 5' 6.14\" (1.68 m)   Wt 196 lb 12.8 oz (89.3 kg)   SpO2 100%   BMI 31.63 kg/m²       Depression Screening:   3 most recent PHQ Screens 10/27/2022   Little interest or pleasure in doing things Not at all   Feeling down, depressed, irritable, or hopeless More than half the days   Total Score PHQ 2 2       Health Maintenance: reviewed and discussed and ordered per Provider. Health Maintenance Due   Topic Date Due    Hepatitis C Screening  Never done    Depression Screen  Never done    DTaP/Tdap/Td series (1 - Tdap) 01/02/2005    Shingrix Vaccine Age 50> (1 of 2) Never done    COVID-19 Vaccine (2 - Moderna series) 12/31/2021     Coordination of Care:   1. \"Have you been to the ER, urgent care clinic since your last visit? Hospitalized since your last visit? \" No    2. \"Have you seen or consulted any other health care providers outside of the [address] [place] [address] since your last visit? \" No     3. For patients aged 39-70: Has the patient had a colonoscopy / FIT/ Cologuard? Yes - no Care Gap present    If the patient is female:    4. For patients aged 41-77: Has the patient had a mammogram within the past 2 years? Yes - no Care Gap present    5. For patients aged 21-65: Has the patient had a pap smear?  Yes - no Care Gap present     [address] [place] [address]

## 2023-01-06 ENCOUNTER — OFFICE VISIT (OUTPATIENT)
Dept: FAMILY MEDICINE CLINIC | Age: 58
End: 2023-01-06
Payer: OTHER GOVERNMENT

## 2023-01-06 VITALS
HEART RATE: 87 BPM | BODY MASS INDEX: 39.07 KG/M2 | DIASTOLIC BLOOD PRESSURE: 71 MMHG | TEMPERATURE: 97.1 F | RESPIRATION RATE: 17 BRPM | WEIGHT: 199 LBS | OXYGEN SATURATION: 99 % | SYSTOLIC BLOOD PRESSURE: 142 MMHG | HEIGHT: 60 IN

## 2023-01-06 DIAGNOSIS — I10 ESSENTIAL HYPERTENSION: Primary | ICD-10-CM

## 2023-01-06 DIAGNOSIS — G89.29 CHRONIC BILATERAL LOW BACK PAIN WITH BILATERAL SCIATICA: ICD-10-CM

## 2023-01-06 DIAGNOSIS — M54.42 CHRONIC BILATERAL LOW BACK PAIN WITH BILATERAL SCIATICA: ICD-10-CM

## 2023-01-06 DIAGNOSIS — M79.18 MYOFASCIAL PAIN: ICD-10-CM

## 2023-01-06 DIAGNOSIS — M25.561 ACUTE PAIN OF RIGHT KNEE: ICD-10-CM

## 2023-01-06 DIAGNOSIS — M54.41 CHRONIC BILATERAL LOW BACK PAIN WITH BILATERAL SCIATICA: ICD-10-CM

## 2023-01-06 RX ORDER — AMLODIPINE BESYLATE 2.5 MG/1
2.5 TABLET ORAL DAILY
Qty: 30 TABLET | Refills: 1 | Status: SHIPPED | OUTPATIENT
Start: 2023-01-06 | End: 2023-01-09 | Stop reason: SDUPTHER

## 2023-01-06 RX ORDER — DULOXETIN HYDROCHLORIDE 30 MG/1
30 CAPSULE, DELAYED RELEASE ORAL 2 TIMES DAILY
Qty: 60 CAPSULE | Refills: 4 | Status: SHIPPED | OUTPATIENT
Start: 2023-01-06 | End: 2023-01-09 | Stop reason: SDUPTHER

## 2023-01-06 NOTE — PROGRESS NOTES
Will Sukumar presents today for   Chief Complaint   Patient presents with    Follow-up       Is someone accompanying this pt? no    Is the patient using any DME equipment during OV? no    Depression Screening:  3 most recent PHQ Screens 1/6/2023   Little interest or pleasure in doing things Not at all   Feeling down, depressed, irritable, or hopeless Several days   Total Score PHQ 2 1       Learning Assessment:  Learning Assessment 10/27/2022   PRIMARY LEARNER Patient   HIGHEST LEVEL OF EDUCATION - PRIMARY LEARNER  4 YEARS OF COLLEGE   BARRIERS PRIMARY LEARNER NONE   CO-LEARNER CAREGIVER -   PRIMARY LANGUAGE ENGLISH   LEARNER PREFERENCE PRIMARY READING     -     -     -   ANSWERED BY patient   RELATIONSHIP SELF       Abuse Screening:  Abuse Screening Questionnaire 10/27/2022   Do you ever feel afraid of your partner? N   Are you in a relationship with someone who physically or mentally threatens you? N   Is it safe for you to go home? Y       Fall Risk  No flowsheet data found. Health Maintenance reviewed and discussed and ordered per Provider. Health Maintenance Due   Topic Date Due    Hepatitis C Screening  Never done    DTaP/Tdap/Td series (1 - Tdap) 01/02/2005    Shingles Vaccine (1 of 2) Never done    COVID-19 Vaccine (2 - Moderna series) 12/31/2021   .        1. \"Have you been to the ER, urgent care clinic since your last visit? Hospitalized since your last visit? \" No    2. \"Have you seen or consulted any other health care providers outside of the [address] [place] [address] since your last visit? \" No     3. For patients over 45: Has the patient had a colonoscopy? Yes - no Care Gap present     If the patient is female:    4. For patients over 40: Has the patient had a mammogram? No    5. For patients over 21: Has the patient had a pap smear?  Yes in October 2022 was abnormal will go back 11/23

## 2023-01-09 DIAGNOSIS — M79.18 MYOFASCIAL PAIN: ICD-10-CM

## 2023-01-09 DIAGNOSIS — M54.41 CHRONIC BILATERAL LOW BACK PAIN WITH BILATERAL SCIATICA: ICD-10-CM

## 2023-01-09 DIAGNOSIS — G89.29 CHRONIC BILATERAL LOW BACK PAIN WITH BILATERAL SCIATICA: ICD-10-CM

## 2023-01-09 DIAGNOSIS — I10 ESSENTIAL HYPERTENSION: ICD-10-CM

## 2023-01-09 DIAGNOSIS — M54.42 CHRONIC BILATERAL LOW BACK PAIN WITH BILATERAL SCIATICA: ICD-10-CM

## 2023-01-09 RX ORDER — AMLODIPINE BESYLATE 2.5 MG/1
2.5 TABLET ORAL DAILY
Qty: 30 TABLET | Refills: 1 | Status: SHIPPED | OUTPATIENT
Start: 2023-01-09

## 2023-01-09 RX ORDER — DULOXETIN HYDROCHLORIDE 30 MG/1
30 CAPSULE, DELAYED RELEASE ORAL 2 TIMES DAILY
Qty: 60 CAPSULE | Refills: 4 | Status: SHIPPED | OUTPATIENT
Start: 2023-01-09

## 2023-01-09 NOTE — TELEPHONE ENCOUNTER
This patient contacted the office for the following prescriptions to be refilled:    Medication requested :   Requested Prescriptions     Pending Prescriptions Disp Refills    amLODIPine (NORVASC) 2.5 mg tablet 30 Tablet 1     Sig: Take 1 Tablet by mouth daily. DULoxetine (CYMBALTA) 30 mg capsule 60 Capsule 4     Sig: Take 1 Capsule by mouth two (2) times a day. PCP: Mabeline Closs, MD  LOV: 1/6/2023  NOV DMA: 2/22/2023  FUTURE APPT:   Future Appointments   Date Time Provider Eliana Anahi   2/22/2023  9:20 AM Mabeline Closs, MD DMA BS AMB         Thank you.

## 2023-01-15 NOTE — PROGRESS NOTES
Rosalio Burr is a 65 y.o.  female and presents with    Chief Complaint   Patient presents with    Follow-up    Knee Pain     Has some right knee pain and swelling for about three weeks. Noticed it cracks when walking has treat hot and cold compressions. Taken anti-inflammatory and elevates it has no bruising. Subjective:    PT has not had the dx of HTN previously. Last appt we had discussed that if Bps continued to be elevated we would need to start a new medication. She states she has been taking the Duloxetine for her back and has noted an improvement in her pain. She is complaining of R knee pain. This started for about 3 weeks, and has noted this to crack when walking. Feels she might had exacerbated her knee in the way that she gets out of the car. States sometime she might overly extend her knee laterally when she is getting out of the car. She might have heard a pop. Was more swollen and with worse pain but this has improved. Denies trauma to the area. States  that OTC meds have helped.     Patient Active Problem List   Diagnosis Code    Migraines I20.414    DCIS (ductal carcinoma in situ) of breast D05.10    Allergic rhinitis J30.9    GERD (gastroesophageal reflux disease) K21.9    Microscopic hematuria R31.29    Hyperparathyroidism ([facility] [address] E21.3    Chronic bilateral low back pain with bilateral sciatica M54.42, M54.41, G89.29      Past Medical History:   Diagnosis Date    Allergic rhinitis     Contact dermatitis and other eczema, due to unspecified cause     DCIS (ductal carcinoma in situ) of breast 12/06    Fibroids, uterine     GERD (gastroesophageal reflux disease)     Hematuria 12/2013    saw Dr. Leticia Diaz    Hyperparathyroidism [facility])     Lung nodule     Microscopic hematuria     Migraines     Prediabetes     Recurrent UTI 12/2013    saw Dr. Leticia Diaz, macrodantin per notes as needed    TIA (transient ischemic attack) 2012      Past Surgical History:   Procedure Laterality Date    HX BREAST RECONSTRUCTION  1/11    Bilateral    HX MASTECTOMY Bilateral 03/2009    Bilateral    TN MYOMECTOMY 1-4 MYOMAS W/250 GM/< ABDOMINAL APPR  8/05      Family History   Problem Relation Age of Onset    Diabetes Mother     Kidney Disease Father     Diabetes Maternal Grandmother      Social History     Socioeconomic History    Marital status:      Spouse name: Not on file    Number of children: Not on file    Years of education: Not on file    Highest education level: Not on file   Occupational History    Occupation: retired    Tobacco Use    Smoking status: Never    Smokeless tobacco: Never   Substance and Sexual Activity    Alcohol use: No    Drug use: No    Sexual activity: Yes     Partners: Male     Birth control/protection: Condom   Other Topics Concern    Not on file   Social History Narrative    Not on file     Social Determinants of Health     Financial Resource Strain: Not on file   Food Insecurity: Not on file   Transportation Needs: Not on file   Physical Activity: Unknown    Days of Exercise per Week: Patient refused    Minutes of Exercise per Session: Not on file   Stress: Not on file   Social Connections: Not on file   Intimate Partner Violence: Not At Risk    Fear of Current or Ex-Partner: No    Emotionally Abused: No    Physically Abused: No    Sexually Abused: No   Housing Stability: Not on file        Current Outpatient Medications   Medication Sig Dispense Refill    ibuprofen (MOTRIN PO) Take  by mouth. METHOCARBAMOL PO Take  by mouth.      loratadine (CLARITIN) 10 mg tablet Take 1 tablet by mouth daily. 90 tablet 3    esomeprazole (NEXIUM) 40 mg capsule Take 1 capsule by mouth daily. 90 capsule 1    amLODIPine (NORVASC) 2.5 mg tablet Take 1 Tablet by mouth daily. 30 Tablet 1    DULoxetine (CYMBALTA) 30 mg capsule Take 1 Capsule by mouth two (2) times a day. 60 Capsule 4        ROS   Review of Systems   Constitutional:  Negative for chills, fever and malaise/fatigue. HENT:  Negative for congestion, ear discharge and ear pain. Eyes:  Negative for blurred vision, pain and discharge. Respiratory:  Negative for cough and shortness of breath. Cardiovascular:  Negative for chest pain and palpitations. Gastrointestinal:  Negative for abdominal pain, nausea and vomiting. Genitourinary:  Negative for dysuria, frequency and urgency. Musculoskeletal:  Positive for joint pain. Skin:  Negative for itching and rash. Neurological:  Negative for dizziness, seizures, loss of consciousness and headaches. Psychiatric/Behavioral:  Negative for substance abuse. Objective:  Vitals:    01/06/23 1433 01/06/23 1435   BP: (!) 156/79 (!) 142/71   Pulse: 87    Resp: 17    Temp: 97.1 °F (36.2 °C)    TempSrc: Temporal    SpO2: 99%    Weight: 199 lb (90.3 kg)    Height: 5' (1.524 m)        Physical Exam  Vitals reviewed. Constitutional:       Appearance: Normal appearance. Eyes:      General: No scleral icterus. Right eye: No discharge. Left eye: No discharge. Cardiovascular:      Rate and Rhythm: Normal rate and regular rhythm. Pulmonary:      Effort: Pulmonary effort is normal. No respiratory distress. Breath sounds: Normal breath sounds. Musculoskeletal:      Cervical back: Normal range of motion and neck supple. Right knee: Swelling and crepitus present. No tenderness. Left knee: Crepitus present. Neurological:      Mental Status: She is alert and oriented to person, place, and time. Cranial Nerves: No cranial nerve deficit. Psychiatric:         Mood and Affect: Mood normal.         Behavior: Behavior normal.         Thought Content: Thought content normal.         Judgment: Judgment normal.         LABS     TESTS      Assessment/Plan:    1. Chronic bilateral low back pain with bilateral sciatica  Continue w/ Duloxetine. Pt has only been taking 30mg once daily. Discussed that she can go up to BID    2.  Myofascial pain  Ordered TENS unit for pt. 3. Essential hypertension  New dx  -Started pt on Amlodipine 2.5 mg to be taken daily. 4. Acute pain of right knee  Seems that pt has OA. Could have had an exacerbation but seems to have resolved. If not better by next appt will send to the specialist.    Lab review: no lab studies available for review at time of visit      I have discussed the diagnosis with the patient and the intended plan as seen in the above orders. The patient has received an after-visit summary and questions were answered concerning future plans. I have discussed medication side effects and warnings with the patient as well. I have reviewed the plan of care with the patient, accepted their input and they are in agreement with the treatment goals.          Lily Souza MD

## 2023-01-31 DIAGNOSIS — I10 ESSENTIAL HYPERTENSION: ICD-10-CM

## 2023-01-31 NOTE — TELEPHONE ENCOUNTER
This patient contacted the office for the following prescriptions to be refilled:    Medication requested :   Requested Prescriptions     Pending Prescriptions Disp Refills    amLODIPine (NORVASC) 2.5 mg tablet 30 Tablet 1     Sig: Take 1 Tablet by mouth daily. PCP: Danielle Bolanos MD  LOV: 1/6/2023 NOV DMA: Visit date not found  FUTURE APPT: No future appointments. Thank you.

## 2023-02-02 RX ORDER — AMLODIPINE BESYLATE 2.5 MG/1
2.5 TABLET ORAL DAILY
Qty: 30 TABLET | Refills: 1 | Status: SHIPPED | OUTPATIENT
Start: 2023-02-02

## 2023-02-22 ENCOUNTER — OFFICE VISIT (OUTPATIENT)
Facility: CLINIC | Age: 58
End: 2023-02-22
Payer: OTHER GOVERNMENT

## 2023-02-22 VITALS
HEIGHT: 60 IN | HEART RATE: 89 BPM | TEMPERATURE: 98.1 F | SYSTOLIC BLOOD PRESSURE: 134 MMHG | OXYGEN SATURATION: 98 % | DIASTOLIC BLOOD PRESSURE: 80 MMHG | WEIGHT: 198.8 LBS | RESPIRATION RATE: 16 BRPM | BODY MASS INDEX: 39.03 KG/M2

## 2023-02-22 DIAGNOSIS — M54.42 CHRONIC BILATERAL LOW BACK PAIN WITH LEFT-SIDED SCIATICA: ICD-10-CM

## 2023-02-22 DIAGNOSIS — G89.29 CHRONIC BILATERAL LOW BACK PAIN WITH LEFT-SIDED SCIATICA: ICD-10-CM

## 2023-02-22 DIAGNOSIS — I10 ESSENTIAL (PRIMARY) HYPERTENSION: ICD-10-CM

## 2023-02-22 DIAGNOSIS — F41.9 ANXIETY: Primary | ICD-10-CM

## 2023-02-22 PROCEDURE — 99214 OFFICE O/P EST MOD 30 MIN: CPT | Performed by: STUDENT IN AN ORGANIZED HEALTH CARE EDUCATION/TRAINING PROGRAM

## 2023-02-22 PROCEDURE — 3075F SYST BP GE 130 - 139MM HG: CPT | Performed by: STUDENT IN AN ORGANIZED HEALTH CARE EDUCATION/TRAINING PROGRAM

## 2023-02-22 PROCEDURE — 3079F DIAST BP 80-89 MM HG: CPT | Performed by: STUDENT IN AN ORGANIZED HEALTH CARE EDUCATION/TRAINING PROGRAM

## 2023-02-22 RX ORDER — FLUTICASONE PROPIONATE 50 MCG
SPRAY, SUSPENSION (ML) NASAL
COMMUNITY
Start: 2012-12-11

## 2023-02-22 RX ORDER — AMLODIPINE BESYLATE 2.5 MG/1
TABLET ORAL
COMMUNITY
Start: 2023-02-06 | End: 2023-02-22 | Stop reason: SDUPTHER

## 2023-02-22 RX ORDER — DULOXETIN HYDROCHLORIDE 60 MG/1
60 CAPSULE, DELAYED RELEASE ORAL DAILY
Qty: 90 CAPSULE | Refills: 1 | Status: SHIPPED | OUTPATIENT
Start: 2023-02-22

## 2023-02-22 RX ORDER — AMLODIPINE BESYLATE 2.5 MG/1
2.5 TABLET ORAL DAILY
Qty: 90 TABLET | Refills: 1 | Status: SHIPPED | OUTPATIENT
Start: 2023-02-22

## 2023-02-22 RX ORDER — IBUPROFEN 800 MG/1
800 TABLET ORAL
COMMUNITY
Start: 2022-02-24

## 2023-02-22 SDOH — ECONOMIC STABILITY: HOUSING INSECURITY
IN THE LAST 12 MONTHS, WAS THERE A TIME WHEN YOU DID NOT HAVE A STEADY PLACE TO SLEEP OR SLEPT IN A SHELTER (INCLUDING NOW)?: NO

## 2023-02-22 SDOH — ECONOMIC STABILITY: INCOME INSECURITY: HOW HARD IS IT FOR YOU TO PAY FOR THE VERY BASICS LIKE FOOD, HOUSING, MEDICAL CARE, AND HEATING?: NOT HARD AT ALL

## 2023-02-22 SDOH — ECONOMIC STABILITY: FOOD INSECURITY: WITHIN THE PAST 12 MONTHS, YOU WORRIED THAT YOUR FOOD WOULD RUN OUT BEFORE YOU GOT MONEY TO BUY MORE.: NEVER TRUE

## 2023-02-22 SDOH — ECONOMIC STABILITY: FOOD INSECURITY: WITHIN THE PAST 12 MONTHS, THE FOOD YOU BOUGHT JUST DIDN'T LAST AND YOU DIDN'T HAVE MONEY TO GET MORE.: NEVER TRUE

## 2023-02-22 ASSESSMENT — ENCOUNTER SYMPTOMS
EYE ITCHING: 0
EYE DISCHARGE: 0
EYE REDNESS: 0
VOMITING: 0
EYE PAIN: 0
SHORTNESS OF BREATH: 0
DIARRHEA: 0
ABDOMINAL PAIN: 0
FACIAL SWELLING: 0
CONSTIPATION: 0
BACK PAIN: 0
COLOR CHANGE: 0

## 2023-02-22 ASSESSMENT — PATIENT HEALTH QUESTIONNAIRE - PHQ9
1. LITTLE INTEREST OR PLEASURE IN DOING THINGS: 0
SUM OF ALL RESPONSES TO PHQ QUESTIONS 1-9: 2
SUM OF ALL RESPONSES TO PHQ QUESTIONS 1-9: 2
SUM OF ALL RESPONSES TO PHQ9 QUESTIONS 1 & 2: 2
SUM OF ALL RESPONSES TO PHQ QUESTIONS 1-9: 2
SUM OF ALL RESPONSES TO PHQ QUESTIONS 1-9: 2
2. FEELING DOWN, DEPRESSED OR HOPELESS: 2

## 2023-02-22 NOTE — PROGRESS NOTES
Wenceslao Hallman is a 65 y.o. presents today for   Chief Complaint   Patient presents with    Hypertension     Is someone accompanying this pt? Yes       Is the patient using any DME equipment during [address]? No     Health Maintenance Due   Topic Date Due    HIV screen  Never done    Hepatitis C screen  Never done    DTaP/Tdap/Td vaccine (1 - Tdap) 01/02/2005    Colorectal Cancer Screen  Never done    Cervical cancer screen  02/10/2013    Shingles vaccine (1 of 2) Never done    A1C test (Diabetic or Prediabetic)  07/16/2020         Coordination of Care:   1. \"Have you been to the ER, urgent care clinic since your last visit? Hospitalized since your last visit? \" No    2. \"Have you seen or consulted any other health care providers outside of the [address] [place] [address] since your last visit? \" No     3. For patients aged 39-70: Has the patient had a colonoscopy / FIT/ Cologuard? Yes - no Care Gap present      If the patient is female:    4. For patients aged 41-77: Has the patient had a mammogram within the past 2 years? Yes - no Care Gap present      5. For patients aged 21-65: Has the patient had a pap smear?  Yes - no Care Gap present    [address] [place] [address]

## 2023-02-22 NOTE — PROGRESS NOTES
Ron Olivas is a 65 y.o.  female and presents with    Chief Complaint   Patient presents with    Hypertension           Subjective:    Hypertension follow up:  Taking medications as prescribed: Yes  Checking BP at home: Yes  Symptoms: none  Exercise: yes     Duloxetiene is helping with her mental health. She was having issues when it came to being able to be strong for those around her, and ignore how she felt. She was previously also feeling that she did not want to shower or even getting dressed. She sees how she used to go from 0-100, now with the medication she can now stop and think before acting. She was overwhelmed with stress, and the w/ the double mastectomy and reconstruction surgery that she had over 10 yrs ago d/t breast cancer. Her back pain was also giving her issues w/ rest which would not allow her to have a full night sleep. Was overwhelmed with too many things     She is getting rest now, but is throughout the day. She takes naps in the day. After a couple of hours of taking the duloxetiene she gets tired. In January she had the episode with her back. That takes her down. At that point her  had to help her do her daily activities. The methacarbamol and the motrin seems to help with it. TENS unit also helps. The duloxetiene seems like it helped her not have spasms.                Patient Active Problem List   Diagnosis    Migraines    Hyperparathyroidism ([address]    Allergic rhinitis    DCIS (ductal carcinoma in situ) of breast    Microscopic hematuria    GERD (gastroesophageal reflux disease)    Chronic bilateral low back pain with bilateral sciatica      Past Medical History:   Diagnosis Date    Allergic rhinitis     Contact dermatitis and other eczema, due to unspecified cause     DCIS (ductal carcinoma in situ) of breast 12/06    GERD (gastroesophageal reflux disease)     Hematuria 12/2013    saw Dr. Israel Khan    Hyperparathyroidism [facility])     Lung nodule Microscopic hematuria     Migraines     Prediabetes     Recurrent UTI 12/2013    saw Dr. Zoila Goel, macrodantin per notes as needed    TIA (transient ischemic attack) 2012      Past Surgical History:   Procedure Laterality Date    BREAST RECONSTRUCTION  1/11    Bilateral    MASTECTOMY Bilateral 03/2009    Bilateral    MYOMECTOMY 1-4,W/TOT 250GMS/<,ABD THE [facility]  8/05      Family History   Problem Relation Age of Onset    Diabetes Maternal Grandmother     Kidney Disease Father     Diabetes Mother      Social History     Socioeconomic History    Marital status:      Spouse name: Not on file    Number of children: Not on file    Years of education: Not on file    Highest education level: Not on file   Occupational History    Not on file   Tobacco Use    Smoking status: Never    Smokeless tobacco: Never   Substance and Sexual Activity    Alcohol use: No    Drug use: No    Sexual activity: Not on file   Other Topics Concern    Not on file   Social History Narrative    Not on file     Social Determinants of Health     Financial Resource Strain: Not on file   Food Insecurity: Not on file   Transportation Needs: Not on file   Physical Activity: Unknown    Days of Exercise per Week: Patient refused    Minutes of Exercise per Session: Not on file   Stress: Not on file   Social Connections: Not on file   Intimate Partner Violence: Not At Risk    Fear of Current or Ex-Partner: No    Emotionally Abused: No    Physically Abused: No    Sexually Abused: No   Housing Stability: Not on file        Current Outpatient Medications   Medication Sig Dispense Refill    amLODIPine (NORVASC) 2.5 MG tablet       ibuprofen (ADVIL;MOTRIN) 800 MG tablet 800 mg      fluticasone (FLONASE) 50 MCG/ACT nasal spray by Nasal route      METHOCARBAMOL PO Take by mouth      DULoxetine (CYMBALTA) 30 MG extended release capsule Take 30 mg by mouth 2 times daily      esomeprazole (NEXIUM) 40 MG delayed release capsule Take 40 mg by mouth daily loratadine (CLARITIN) 10 MG tablet Take 10 mg by mouth daily       No current facility-administered medications for this visit. ROS   Review of Systems   Constitutional:  Negative for activity change and appetite change. HENT:  Negative for congestion, drooling, ear discharge, ear pain and facial swelling. Eyes:  Negative for pain, discharge, redness and itching. Respiratory:  Negative for shortness of breath. Cardiovascular:  Negative for leg swelling. Gastrointestinal:  Negative for abdominal pain, constipation, diarrhea and vomiting. Genitourinary:  Negative for difficulty urinating, frequency, hematuria and urgency. Musculoskeletal:  Negative for arthralgias, back pain, myalgias and neck pain. Skin:  Negative for color change. Neurological:  Negative for dizziness, seizures, numbness and headaches. Psychiatric/Behavioral:  Negative for agitation, behavioral problems, decreased concentration, sleep disturbance and suicidal ideas. Objective:  Vitals:    02/22/23 0957   BP: 134/80   Pulse:    Resp:    Temp:    SpO2:          Physical Exam  Vitals reviewed. Constitutional:       Appearance: She is normal weight. HENT:      Head: Normocephalic. Nose: No congestion or rhinorrhea. Eyes:      General: No scleral icterus. Right eye: No discharge. Left eye: No discharge. Cardiovascular:      Rate and Rhythm: Normal rate and regular rhythm. Pulmonary:      Effort: Pulmonary effort is normal.      Breath sounds: Normal breath sounds. Abdominal:      General: Abdomen is flat. Palpations: Abdomen is soft. Musculoskeletal:         General: Normal range of motion. Cervical back: Normal range of motion and neck supple. No rigidity. Skin:     General: Skin is warm and dry. Neurological:      Mental Status: She is alert and oriented to person, place, and time.       Gait: Gait normal.   Psychiatric:         Mood and Affect: Mood normal. Behavior: Behavior normal.         Thought Content: Thought content normal.         Judgment: Judgment normal.         LABS     TESTS      Assessment/Plan:    1. Anxiety  - DULoxetine (CYMBALTA) 60 MG extended release capsule; Take 1 capsule by mouth daily  Dispense: 90 capsule; Refill: 1    2. Chronic bilateral low back pain with left-sided sciatica  - DULoxetine (CYMBALTA) 60 MG extended release capsule; Take 1 capsule by mouth daily  Dispense: 90 capsule; Refill: 1    3. Essential (primary) hypertension  stable  - amLODIPine (NORVASC) 2.5 MG tablet; Take 1 tablet by mouth daily  Dispense: 90 tablet; Refill: 1      Lab review: no lab studies available for review at time of visit        I have discussed the diagnosis with the patient and the intended plan as seen in the above orders. The patient has received an after-visit summary and questions were answered concerning future plans. I have discussed medication side effects and warnings with the patient as well. I have reviewed the plan of care with the patient, accepted their input and they are in agreement with the treatment goals.      Di Ford MD

## 2023-03-01 DIAGNOSIS — M54.42 CHRONIC BILATERAL LOW BACK PAIN WITH LEFT-SIDED SCIATICA: ICD-10-CM

## 2023-03-01 DIAGNOSIS — F41.9 ANXIETY: ICD-10-CM

## 2023-03-01 DIAGNOSIS — G89.29 CHRONIC BILATERAL LOW BACK PAIN WITH LEFT-SIDED SCIATICA: ICD-10-CM

## 2023-03-01 DIAGNOSIS — I10 ESSENTIAL (PRIMARY) HYPERTENSION: ICD-10-CM

## 2023-03-13 RX ORDER — AMLODIPINE BESYLATE 2.5 MG/1
2.5 TABLET ORAL DAILY
Qty: 90 TABLET | Refills: 1 | Status: SHIPPED | OUTPATIENT
Start: 2023-03-13

## 2023-03-13 RX ORDER — DULOXETIN HYDROCHLORIDE 60 MG/1
60 CAPSULE, DELAYED RELEASE ORAL DAILY
Qty: 90 CAPSULE | Refills: 1 | Status: SHIPPED | OUTPATIENT
Start: 2023-03-13

## 2023-06-02 NOTE — PROGRESS NOTES
SLEEP STUDY INTERPRETATION  SPLIT NIGHT STUDY      Patient: ISELA COLLAZO  YOB: 1964  Study Date: 5/31/2023  MRN: [id number]  Referring Provider: Say Hunter  Ordering Provider: Gurwinder Roberto    Indications for Polysomnography: The patient is a 58 year old Male who is 6' and weighs 297.0 lbs. His BMI is 40.7, Henderson sleepiness scale 02 and neck circumference is - cm. A diagnostic polysomnogram was performed to evaluate for sleep apnea. After 142.5 minutes of sleep time the patient exhibited sufficient respiratory events qualifying him for a CPAP trial which was then initiated.    Polysomnogram Data: A full night polysomnogram recorded the standard physiologic parameters including EEG, EOG, EMG, ECG, nasal and oral airflow. Respiratory parameters of chest and abdominal movements were recorded with respiratory inductance plethysmography. Oxygen saturation was recorded by pulse oximetry.  Hypopnea scoring rule used: 1B 4%    Diagnostic PSG  Sleep Architecture: Prolonged sleep latency with a reduced sleep efficiency.  The total recording time of the polysomnogram was 354.0 minutes. The total sleep time was 142.5 minutes. Sleep latency was increased at 188.0 minutes without the use of a sleep aid. REM latency was 91.5 minutes. Arousal index was increased at 19.4 arousals per hour. Sleep efficiency was decreased at 40.3%. Wake after sleep onset was 23.5 minutes. The patient spent 8.1% of total sleep time in Stage N1, 53.3% in Stage N2, 8.4% in Stage N3, and 30.2% in REM. Time in REM supine was 43.0 minutes.    Respiration: Moderate obstructive sleep apnea and associated oxygen desaturations.    Events ? The polysomnogram revealed a presence of - obstructive, - central, and - mixed apneas resulting in an apnea index of - events per hour. There were 53 obstructive hypopneas and - central hypopneas resulting in an obstructive hypopnea index of 22.3 and central hypopnea index of - events per hour. The  PT DAILY TREATMENT NOTE 10-18    Patient Name: Stef Coffey  Date:2019  : 1965  [x]  Patient  Verified  Payor: Florentin Hanson / Plan: [employer] / Product Type: [employer] /    In time:1000  Out time:1055  Total Treatment Time (min): 55  Visit #: 7 of 12    Medicare/BCBS Only   Total Timed Codes (min):   1:1 Treatment Time:         Treatment Area: Low back pain [M54.5]    SUBJECTIVE  Pain Level (0-10 scale): 3  Any medication changes, allergies to medications, adverse drug reactions, diagnosis change, or new procedure performed?: [x] No    [] Yes (see summary sheet for update)  Subjective functional status/changes:   [] No changes reported  I'm doing ok. I haven't had anymore spasms.      OBJECTIVE    Modality rationale: decrease pain to improve the patients ability to To tolerate post exercise soreness     Min Type Additional Details    [] Estim:  []Unatt       []IFC  []Premod                        []Other:  []w/ice   []w/heat  Position:  Location:    [] Estim: []Att    []TENS instruct  []NMES                    []Other:  []w/US   []w/ice   []w/heat  Position:  Location:    []  Traction: [] Cervical       []Lumbar                       [] Prone          []Supine                       []Intermittent   []Continuous Lbs:  [] before manual  [] after manual    []  Ultrasound: []Continuous   [] Pulsed                           []1MHz   []3MHz W/cm2:  Location:    []  Iontophoresis with dexamethasone         Location: [] Take home patch   [] In clinic   10 []  Ice     [x]  heat  []  Ice massage  []  Laser   []  Anodyne Position:seated  Location:L/S    []  Laser with stim  []  Other:  Position:  Location:    []  Vasopneumatic Device Pressure:       [] lo [] med [] hi   Temperature: [] lo [] med [] hi   [] Skin assessment post-treatment:  []intact []redness- no adverse reaction    []redness  adverse reaction:       8 min Therapeutic Exercise:  [] See flow sheet :   Rationale: increase ROM and increase strength to improve the patients ability to perform daily activities'     29 min Neuromuscular Re-education:  []  See flow sheet :   Rationale: increase ROM, increase strength, improve coordination, improve balance and increase proprioception  to improve the patients ability to recruit TA and glutes for daily activiites    8 min Manual Therapy:  Alignment check; STM B glutes   Rationale: decrease pain, increase ROM and increase tissue extensibility to improve glute recruitment            With   [] TE   [] TA   [] neuro   [] other: Patient Education: [x] Review HEP    [] Progressed/Changed HEP based on:   [] positioning   [] body mechanics   [] transfers   [] heat/ice application    [] other:      Other Objective/Functional Measures: level alignment     Pain Level (0-10 scale) post treatment: 0    ASSESSMENT/Changes in Function: less rib flare noted with reformer exercises; continues to lack T/S mobility in all directions. Patient will continue to benefit from skilled PT services to modify and progress therapeutic interventions, address functional mobility deficits, address ROM deficits, address strength deficits, analyze and address soft tissue restrictions, analyze and cue movement patterns and assess and modify postural abnormalities to attain remaining goals. []  See Plan of Care  []  See progress note/recertification  []  See Discharge Summary         Progress towards goals / Updated goals:  1. Pt will be (I) and complaint w/ HEP to progress gains in PT. goal met5/7/19     Long Term Goals: To be accomplished in 6 weeks:  1. Pt will improve FOTO to > or = to 66 to demo improved function. 2. Pt will improve core strength to > or = to 4/5 for ease w/ prolonged standing to cook dinner. Not met- cueing to correct rib flare in supine  5/16/19; progressing with refomrer exercises 5/30/19  3. Pt will left hip MMT to > or = to 4+/5 for ease w/ performing heavier activity around her house. combined apnea/hypopnea index was 22.3 events per hour (central apnea/hypopnea index was - events per hour).  The REM AHI was 44.7 events per hour. The supine AHI was 22.3 events per hour. The RERA index was 0.8 events per hour. The RDI was 23.2 events per hour.    Snoring - was reported as mild.    Respiratory rate and pattern - was notable for normal respiratory rate and pattern.    Sustained Sleep Associated Hypoventilation - Transcutaneous carbon dioxide monitoring was not used, however significant hypoventilation was not suggested by oximetry.    Sleep Associated Hypoxemia - (Greater than 5 minutes O2 sat at or below 88%) was present. Baseline oxygen saturation was 92.5%. Lowest oxygen saturation was 78.0%. Time spent less than or equal to 88% was 20.7 minutes. Time spent less than or equal to 89% was 27.6 minutes.     Treatment PSG  Sleep Architecture: At 02:55:26 AM the patient was placed on PAP treatment and was titrated at pressures ranging from CPAP 5 cmH2O up to CPAP 9 cmH2O. The total recording time of the treatment portion of the study was 194.9 minutes. The total sleep time was 115.0 minutes. During the treatment portion of the study the sleep latency was 24.0 minutes. REM latency was 47.0 minutes. Arousal index was normal at 5.7 arousals per hour. Sleep efficiency was decreased at 59.0%. Wake after sleep onset was 55.5 minutes. The patient spent 7.0% of total sleep time in Stage N1, 50.9% in Stage N2, 22.6% in Stage N3, and 19.6% in REM. Time in REM supine was 22.5 minutes.     Respiration: The final pressure was CPAP 9 cmH2O with an AHI of - events per hour. Time in REM supine on final pressure was - minutes.     This titration was considered:  - Adequate (residual AHI with 75% decrease or above constraints without REM?supine sleep at final pressure).    Movement Activity: Negative for significant movement abnormalities.    Periodic Limb Movements  o During the diagnostic portion of the study, there  4. Pt will report > or = to 60% overall improvement in symptoms for ease w/ return to unlimited ADLs.          PLAN  []  Upgrade activities as tolerated     [x]  Continue plan of care  []  Update interventions per flow sheet       []  Discharge due to:_  []  Other:_      Job Ingram, PT 5/30/2019  8:50 AM    Future Appointments   Date Time Provider Eliana Christian   5/30/2019 10:00 AM Myrna Ac, PT [facility]   6/3/2019  2:30 PM Michelle Hinojosa, PT [facility]   6/5/2019 10:00 AM Michelle Hinojosa, PT [facility]   6/11/2019 10:30 AM Myrna Ac, PT [facility]   6/14/2019  9:00 AM Jose BRADSHAW, PT [facility]   7/16/2019  8:00 AM Kaiden Ventura PA [facility] SCHED were - PLMs recorded. The PLM index was - movements per hour. The PLM Arousal Index was - per hour.  o During the treatment portion of the study, there were 7 PLMs recorded. The PLM index was 3.7 movements per hour. The PLM Arousal Index was - per hour.    REM EMG Activity - Excessive transient/sustained muscle activity was not present.    Nocturnal Behavior - Abnormal sleep related behaviors were not noted during/arising out of NREM / REM sleep.     Bruxism - None apparent.    Cardiac Summary: Sinus rhythm.   During the diagnostic portion of the study, the average pulse rate was 65.7 bpm. The minimum pulse rate was 58.0 bpm while the maximum pulse rate was 94.0 bpm.    During the treatment portion of the study, the average pulse rate was 62.3 bpm. The minimum pulse rate was 55.0 bpm while the maximum pulse rate was 88.0 bpm.     Arrhythmias were not noted.    Assessment:     The sleep study shows moderate obstructive sleep apnea with associated oxygen desaturations and sleep fragmentation.  Of note, diagnostic portion was entirely in the supine position and sleep apnea events were predominantly REM sleep related.      Sleep apnea events responded to CPAP therapy during titration.  Although final titrated pressure of 9 cm H2O did not include supine REM, brief period of supine REM was seen at a pressure of 8 cm H2O.    Recommendations:    CPAP therapy is the treatment of choice for moderate obstructive sleep apnea. Treatment can be initiated with Auto?titrating PAP therapy with a range of 8 cmH2O to 15 cmH2O. Recommend clinical follow up with sleep management team, including review of compliance measures.    If CPAP is not tolerated, patient may be a candidate for dental appliance through referral to Sleep Dentistry.    If devices are not acceptable or effective, patient may benefit from evaluation of possible surgical options for the treatment of obstructive sleep apnea and/or socially disruptive snoring. If he is  interested, would recommend referral to specialized ENT?Sleep provider.    Suggest optimizing sleep schedule and avoiding sleep deprivation.    Weight management (if BMI > 30).    Diagnostic Codes:   Obstructive Sleep Apnea G47.33  Sleep Hypoxemia G47.36   Repetitive Intrusions Into Sleep F51.8      5/31/2023 [facility] Sleep Study (297.0 lbs) - AHI 22.3, RDI 23.2, Supine AHI 22.3, REM AHI 44.7, Low O2% 78.0%, Time Spent ?88% 20.7, Time Spent ?89% 27.6. Treatment was titrated to a pressure of CPAP 9 with an AHI -. Time spent in REM supine at this pressure was - minutes.     _____________________________________   Electronically Signed By: Gurwinder Roberto MD 6/2/2023

## 2023-07-26 ENCOUNTER — OFFICE VISIT (OUTPATIENT)
Facility: CLINIC | Age: 58
End: 2023-07-26
Payer: OTHER GOVERNMENT

## 2023-07-26 VITALS
SYSTOLIC BLOOD PRESSURE: 134 MMHG | TEMPERATURE: 97.5 F | WEIGHT: 199 LBS | HEIGHT: 66 IN | OXYGEN SATURATION: 99 % | BODY MASS INDEX: 31.98 KG/M2 | HEART RATE: 90 BPM | RESPIRATION RATE: 16 BRPM | DIASTOLIC BLOOD PRESSURE: 79 MMHG

## 2023-07-26 DIAGNOSIS — E21.3 HYPERPARATHYROIDISM (HCC): Primary | ICD-10-CM

## 2023-07-26 DIAGNOSIS — M54.42 CHRONIC BILATERAL LOW BACK PAIN WITH LEFT-SIDED SCIATICA: ICD-10-CM

## 2023-07-26 DIAGNOSIS — G89.29 CHRONIC BILATERAL LOW BACK PAIN WITH LEFT-SIDED SCIATICA: ICD-10-CM

## 2023-07-26 DIAGNOSIS — F41.9 ANXIETY: ICD-10-CM

## 2023-07-26 DIAGNOSIS — I10 ESSENTIAL (PRIMARY) HYPERTENSION: ICD-10-CM

## 2023-07-26 PROCEDURE — 3078F DIAST BP <80 MM HG: CPT | Performed by: STUDENT IN AN ORGANIZED HEALTH CARE EDUCATION/TRAINING PROGRAM

## 2023-07-26 PROCEDURE — 99214 OFFICE O/P EST MOD 30 MIN: CPT | Performed by: STUDENT IN AN ORGANIZED HEALTH CARE EDUCATION/TRAINING PROGRAM

## 2023-07-26 PROCEDURE — 3074F SYST BP LT 130 MM HG: CPT | Performed by: STUDENT IN AN ORGANIZED HEALTH CARE EDUCATION/TRAINING PROGRAM

## 2023-07-26 RX ORDER — DULOXETIN HYDROCHLORIDE 60 MG/1
60 CAPSULE, DELAYED RELEASE ORAL DAILY
Qty: 90 CAPSULE | Refills: 1 | Status: SHIPPED | OUTPATIENT
Start: 2023-07-26

## 2023-07-26 RX ORDER — AMLODIPINE BESYLATE 2.5 MG/1
2.5 TABLET ORAL DAILY
Qty: 90 TABLET | Refills: 1 | Status: SHIPPED | OUTPATIENT
Start: 2023-07-26

## 2023-07-26 ASSESSMENT — PATIENT HEALTH QUESTIONNAIRE - PHQ9
SUM OF ALL RESPONSES TO PHQ QUESTIONS 1-9: 0
2. FEELING DOWN, DEPRESSED OR HOPELESS: 0
SUM OF ALL RESPONSES TO PHQ9 QUESTIONS 1 & 2: 0
1. LITTLE INTEREST OR PLEASURE IN DOING THINGS: 0
SUM OF ALL RESPONSES TO PHQ QUESTIONS 1-9: 0

## 2023-07-27 ASSESSMENT — ENCOUNTER SYMPTOMS
ABDOMINAL PAIN: 0
EYE REDNESS: 0
EYE DISCHARGE: 0
VOMITING: 0
EYE PAIN: 0
BACK PAIN: 1
EYE ITCHING: 0
FACIAL SWELLING: 0
CONSTIPATION: 0
DIARRHEA: 0
COLOR CHANGE: 0
SHORTNESS OF BREATH: 0

## 2023-08-24 DIAGNOSIS — F41.9 ANXIETY: ICD-10-CM

## 2023-08-24 DIAGNOSIS — I10 ESSENTIAL (PRIMARY) HYPERTENSION: ICD-10-CM

## 2023-08-24 DIAGNOSIS — M54.42 CHRONIC BILATERAL LOW BACK PAIN WITH LEFT-SIDED SCIATICA: ICD-10-CM

## 2023-08-24 DIAGNOSIS — G89.29 CHRONIC BILATERAL LOW BACK PAIN WITH LEFT-SIDED SCIATICA: ICD-10-CM

## 2023-08-25 RX ORDER — AMLODIPINE BESYLATE 2.5 MG/1
2.5 TABLET ORAL DAILY
Qty: 90 TABLET | Refills: 1 | Status: SHIPPED | OUTPATIENT
Start: 2023-08-25

## 2023-08-25 RX ORDER — DULOXETIN HYDROCHLORIDE 60 MG/1
60 CAPSULE, DELAYED RELEASE ORAL DAILY
Qty: 90 CAPSULE | Refills: 1 | Status: SHIPPED | OUTPATIENT
Start: 2023-08-25

## 2023-08-25 NOTE — TELEPHONE ENCOUNTER
Medication(s) requesting:   Requested Prescriptions     Pending Prescriptions Disp Refills    amLODIPine (NORVASC) 2.5 MG tablet 90 tablet 1     Sig: Take 1 tablet by mouth daily    DULoxetine (CYMBALTA) 60 MG extended release capsule 90 capsule 1     Sig: Take 1 capsule by mouth daily       Last office visit:  07/26/2023  Next office visit DMA: 1/26/2024  FUTURE APPT:   Future Appointments   Date Time Provider [address] [place] [address]   1/26/2024  7:40 AM Valarie Colin MD [facility]

## 2024-03-06 DIAGNOSIS — F41.9 ANXIETY: ICD-10-CM

## 2024-03-06 DIAGNOSIS — M54.42 CHRONIC BILATERAL LOW BACK PAIN WITH LEFT-SIDED SCIATICA: ICD-10-CM

## 2024-03-06 DIAGNOSIS — G89.29 CHRONIC BILATERAL LOW BACK PAIN WITH LEFT-SIDED SCIATICA: ICD-10-CM

## 2024-03-06 DIAGNOSIS — I10 ESSENTIAL (PRIMARY) HYPERTENSION: ICD-10-CM

## 2024-03-06 RX ORDER — AMLODIPINE BESYLATE 2.5 MG/1
2.5 TABLET ORAL DAILY
Qty: 90 TABLET | Refills: 1 | Status: SHIPPED | OUTPATIENT
Start: 2024-03-06

## 2024-03-06 RX ORDER — DULOXETIN HYDROCHLORIDE 60 MG/1
60 CAPSULE, DELAYED RELEASE ORAL DAILY
Qty: 90 CAPSULE | Refills: 1 | Status: SHIPPED | OUTPATIENT
Start: 2024-03-06

## 2024-04-01 SDOH — ECONOMIC STABILITY: FOOD INSECURITY: WITHIN THE PAST 12 MONTHS, THE FOOD YOU BOUGHT JUST DIDN'T LAST AND YOU DIDN'T HAVE MONEY TO GET MORE.: NEVER TRUE

## 2024-04-01 SDOH — ECONOMIC STABILITY: FOOD INSECURITY: WITHIN THE PAST 12 MONTHS, YOU WORRIED THAT YOUR FOOD WOULD RUN OUT BEFORE YOU GOT MONEY TO BUY MORE.: NEVER TRUE

## 2024-04-01 SDOH — ECONOMIC STABILITY: INCOME INSECURITY: HOW HARD IS IT FOR YOU TO PAY FOR THE VERY BASICS LIKE FOOD, HOUSING, MEDICAL CARE, AND HEATING?: NOT HARD AT ALL

## 2024-04-01 SDOH — ECONOMIC STABILITY: TRANSPORTATION INSECURITY
IN THE PAST 12 MONTHS, HAS LACK OF TRANSPORTATION KEPT YOU FROM MEETINGS, WORK, OR FROM GETTING THINGS NEEDED FOR DAILY LIVING?: NO

## 2024-04-02 ENCOUNTER — HOSPITAL ENCOUNTER (OUTPATIENT)
Facility: HOSPITAL | Age: 59
Setting detail: SPECIMEN
Discharge: HOME | End: 2024-04-05
Payer: OTHER GOVERNMENT

## 2024-04-02 ENCOUNTER — OFFICE VISIT (OUTPATIENT)
Facility: CLINIC | Age: 59
End: 2024-04-02
Payer: OTHER GOVERNMENT

## 2024-04-02 VITALS
WEIGHT: 203.4 LBS | OXYGEN SATURATION: 94 % | DIASTOLIC BLOOD PRESSURE: 88 MMHG | BODY MASS INDEX: 32.69 KG/M2 | HEART RATE: 94 BPM | RESPIRATION RATE: 11 BRPM | SYSTOLIC BLOOD PRESSURE: 131 MMHG | TEMPERATURE: 96.4 F | HEIGHT: 66 IN

## 2024-04-02 DIAGNOSIS — I10 ESSENTIAL (PRIMARY) HYPERTENSION: ICD-10-CM

## 2024-04-02 DIAGNOSIS — Z11.59 NEED FOR HEPATITIS C SCREENING TEST: ICD-10-CM

## 2024-04-02 DIAGNOSIS — E21.3 HYPERPARATHYROIDISM (HCC): ICD-10-CM

## 2024-04-02 DIAGNOSIS — Z11.4 SCREENING FOR HIV WITHOUT PRESENCE OF RISK FACTORS: ICD-10-CM

## 2024-04-02 DIAGNOSIS — R73.03 PREDIABETES: ICD-10-CM

## 2024-04-02 DIAGNOSIS — Z00.00 ENCOUNTER FOR WELL ADULT EXAM WITHOUT ABNORMAL FINDINGS: Primary | ICD-10-CM

## 2024-04-02 LAB
ALBUMIN SERPL-MCNC: 3.7 G/DL (ref 3.4–5)
ALBUMIN/GLOB SERPL: 0.9 (ref 0.8–1.7)
ALP SERPL-CCNC: 123 U/L (ref 45–117)
ALT SERPL-CCNC: 39 U/L (ref 13–56)
ANION GAP SERPL CALC-SCNC: 6 MMOL/L (ref 3–18)
APPEARANCE UR: CLEAR
AST SERPL-CCNC: 27 U/L (ref 10–38)
BACTERIA URNS QL MICRO: ABNORMAL /HPF
BILIRUB SERPL-MCNC: 0.6 MG/DL (ref 0.2–1)
BILIRUB UR QL: NEGATIVE
BUN SERPL-MCNC: 12 MG/DL (ref 7–18)
BUN/CREAT SERPL: 14 (ref 12–20)
CALCIUM SERPL-MCNC: 10.1 MG/DL (ref 8.5–10.1)
CAOX CRY URNS QL MICRO: ABNORMAL
CHLORIDE SERPL-SCNC: 105 MMOL/L (ref 100–111)
CHOLEST SERPL-MCNC: 215 MG/DL
CO2 SERPL-SCNC: 30 MMOL/L (ref 21–32)
COLOR UR: YELLOW
CREAT SERPL-MCNC: 0.88 MG/DL (ref 0.6–1.3)
EPITH CASTS URNS QL MICRO: ABNORMAL /LPF (ref 0–5)
ERYTHROCYTE [DISTWIDTH] IN BLOOD BY AUTOMATED COUNT: 14.6 % (ref 11.6–14.5)
EST. AVERAGE GLUCOSE BLD GHB EST-MCNC: 131 MG/DL
GLOBULIN SER CALC-MCNC: 4.1 G/DL (ref 2–4)
GLUCOSE SERPL-MCNC: 126 MG/DL (ref 74–99)
GLUCOSE UR STRIP.AUTO-MCNC: NEGATIVE MG/DL
HBA1C MFR BLD: 6.2 % (ref 4.2–5.6)
HCT VFR BLD AUTO: 42.3 % (ref 35–45)
HDLC SERPL-MCNC: 57 MG/DL (ref 40–60)
HDLC SERPL: 3.8 (ref 0–5)
HGB BLD-MCNC: 14 G/DL (ref 12–16)
HGB UR QL STRIP: ABNORMAL
KETONES UR QL STRIP.AUTO: NEGATIVE MG/DL
LDLC SERPL CALC-MCNC: 124.6 MG/DL (ref 0–100)
LEUKOCYTE ESTERASE UR QL STRIP.AUTO: ABNORMAL
LIPID PANEL: ABNORMAL
MCH RBC QN AUTO: 31.3 PG (ref 24–34)
MCHC RBC AUTO-ENTMCNC: 33.1 G/DL (ref 31–37)
MCV RBC AUTO: 94.6 FL (ref 78–100)
MUCOUS THREADS URNS QL MICRO: ABNORMAL /LPF
NITRITE UR QL STRIP.AUTO: NEGATIVE
NRBC # BLD: 0 K/UL (ref 0–0.01)
NRBC BLD-RTO: 0 PER 100 WBC
PH UR STRIP: 6 (ref 5–8)
PLATELET # BLD AUTO: 449 K/UL (ref 135–420)
PMV BLD AUTO: 9.7 FL (ref 9.2–11.8)
POTASSIUM SERPL-SCNC: 3.8 MMOL/L (ref 3.5–5.5)
PROT SERPL-MCNC: 7.8 G/DL (ref 6.4–8.2)
PROT UR STRIP-MCNC: ABNORMAL MG/DL
RBC # BLD AUTO: 4.47 M/UL (ref 4.2–5.3)
RBC #/AREA URNS HPF: ABNORMAL /HPF (ref 0–5)
SODIUM SERPL-SCNC: 141 MMOL/L (ref 136–145)
SP GR UR REFRACTOMETRY: 1.02 (ref 1–1.03)
TRIGL SERPL-MCNC: 167 MG/DL
TSH SERPL DL<=0.05 MIU/L-ACNC: 1.13 UIU/ML (ref 0.36–3.74)
UROBILINOGEN UR QL STRIP.AUTO: 1 EU/DL (ref 0.2–1)
VLDLC SERPL CALC-MCNC: 33.4 MG/DL
WBC # BLD AUTO: 7.5 K/UL (ref 4.6–13.2)
WBC URNS QL MICRO: ABNORMAL /HPF (ref 0–4)

## 2024-04-02 PROCEDURE — 86803 HEPATITIS C AB TEST: CPT

## 2024-04-02 PROCEDURE — 87389 HIV-1 AG W/HIV-1&-2 AB AG IA: CPT

## 2024-04-02 PROCEDURE — 3079F DIAST BP 80-89 MM HG: CPT | Performed by: STUDENT IN AN ORGANIZED HEALTH CARE EDUCATION/TRAINING PROGRAM

## 2024-04-02 PROCEDURE — 80053 COMPREHEN METABOLIC PANEL: CPT

## 2024-04-02 PROCEDURE — 84443 ASSAY THYROID STIM HORMONE: CPT

## 2024-04-02 PROCEDURE — 3075F SYST BP GE 130 - 139MM HG: CPT | Performed by: STUDENT IN AN ORGANIZED HEALTH CARE EDUCATION/TRAINING PROGRAM

## 2024-04-02 PROCEDURE — 85027 COMPLETE CBC AUTOMATED: CPT

## 2024-04-02 PROCEDURE — 80061 LIPID PANEL: CPT

## 2024-04-02 PROCEDURE — 81001 URINALYSIS AUTO W/SCOPE: CPT

## 2024-04-02 PROCEDURE — 36415 COLL VENOUS BLD VENIPUNCTURE: CPT

## 2024-04-02 PROCEDURE — 83036 HEMOGLOBIN GLYCOSYLATED A1C: CPT

## 2024-04-02 PROCEDURE — 99214 OFFICE O/P EST MOD 30 MIN: CPT | Performed by: STUDENT IN AN ORGANIZED HEALTH CARE EDUCATION/TRAINING PROGRAM

## 2024-04-02 RX ORDER — MULTIVIT-MIN/IRON/FOLIC ACID/K 18-600-40
CAPSULE ORAL
COMMUNITY

## 2024-04-02 SDOH — ECONOMIC STABILITY: FOOD INSECURITY: WITHIN THE PAST 12 MONTHS, THE FOOD YOU BOUGHT JUST DIDN'T LAST AND YOU DIDN'T HAVE MONEY TO GET MORE.: NEVER TRUE

## 2024-04-02 SDOH — ECONOMIC STABILITY: FOOD INSECURITY: WITHIN THE PAST 12 MONTHS, YOU WORRIED THAT YOUR FOOD WOULD RUN OUT BEFORE YOU GOT MONEY TO BUY MORE.: NEVER TRUE

## 2024-04-02 SDOH — ECONOMIC STABILITY: INCOME INSECURITY: HOW HARD IS IT FOR YOU TO PAY FOR THE VERY BASICS LIKE FOOD, HOUSING, MEDICAL CARE, AND HEATING?: NOT HARD AT ALL

## 2024-04-02 ASSESSMENT — ENCOUNTER SYMPTOMS
BACK PAIN: 0
ABDOMINAL PAIN: 0
EYE DISCHARGE: 0
EYE REDNESS: 0
EYE PAIN: 0
DIARRHEA: 0
SHORTNESS OF BREATH: 0
VOMITING: 0
FACIAL SWELLING: 0
CONSTIPATION: 0
EYE ITCHING: 0
COLOR CHANGE: 0

## 2024-04-02 ASSESSMENT — PATIENT HEALTH QUESTIONNAIRE - PHQ9
SUM OF ALL RESPONSES TO PHQ9 QUESTIONS 1 & 2: 0
1. LITTLE INTEREST OR PLEASURE IN DOING THINGS: NOT AT ALL
SUM OF ALL RESPONSES TO PHQ QUESTIONS 1-9: 0
2. FEELING DOWN, DEPRESSED OR HOPELESS: NOT AT ALL

## 2024-04-02 ASSESSMENT — VISUAL ACUITY: OS_CC: 20/15

## 2024-04-02 NOTE — PROGRESS NOTES
Paz Rosario is a 58 y.o.  female and presents with    Chief Complaint   Patient presents with    Annual Exam           Subjective:    Health Maintenance  Colon cancer: done in 2019  Dyslipidemia: will check FLP and CMP  Diabetes mellitus: will check FBG  Influenza vaccine: due in the fall  Pneumococcal vaccine:   Tdap:   Herpes Zoster vaccine: due at age 50  Hep B vaccine: not indicated    Weight: Body mass index is Estimated body mass index is Body mass index is 32.83 kg/m².. Discussed the patient's BMI with her.  The BMI follow up plan is as follows: Improve diet and 30 min of moderate activity at least 5 times a week.  Cervical cancer:  Pap smear last yr was abnormal. Has one pending this year.   Breast Cancer: Mammogram - implant rupture - last breast exam 2022   Osteoporosis: DEXA scan bone density 03/21/2023 - new one for July 2024  Diet:  no  Exercise:  no  Seatbelt:  YES  Sunscreen: occasional  Dentist: no    Hypertension follow up:  Taking medications as prescribed: Yes  Checking BP at home: Yes  Symptoms: none  Exercise: no      F/up w/ endocrinologist - Dr. Calles for hyperparathyroidism. Had parathyroid surgery in September. Was told PTH is still slightly elevated.     Was being f/up by \[facility]".  Hx of implant ruptured from her breast 2021.   Patient Active Problem List   Diagnosis    Migraines    Hyperparathyroidism (HCC)    Allergic rhinitis    DCIS (ductal carcinoma in situ) of breast    Microscopic hematuria    GERD (gastroesophageal reflux disease)    Chronic bilateral low back pain with bilateral sciatica      Past Medical History:   Diagnosis Date    Allergic rhinitis     Anxiety     Contact dermatitis and other eczema, due to unspecified cause     DCIS (ductal carcinoma in situ) of breast 12/06    Depression     GERD (gastroesophageal reflux disease)     Hematuria 12/2013    saw Dr. Lei    Hyperparathyroidism (HCC)     Hypertension     Lung nodule     Microscopic

## 2024-04-02 NOTE — PROGRESS NOTES
Paz Rosario is a 58 y.o. year old female who presents today for   Chief Complaint   Patient presents with    Annual Exam       Is someone accompanying this pt? Yes    Is the patient using any DME equipment during OV? No     Depression Screenin/2/2024     8:33 AM 2023    11:58 AM 2023     9:50 AM 2023     2:31 PM 10/27/2022     9:53 AM   PHQ-9 Questionaire   Little interest or pleasure in doing things 0 0 0 0 0   Feeling down, depressed, or hopeless 0 0 2 1 2   PHQ-9 Total Score 0 0 2 1 2       Abuse Screening:       No data to display                Learning Assessment:  No question data found.    Fall Risk:       No data to display                    Coordination of Care:   1. \"Have you been to the ER, urgent care clinic since your last visit?  Hospitalized since your last visit?\" No     2. \"Have you seen or consulted any other health care providers outside of the [facility] since your last visit?\" Yes     3. For patients aged 45-75: Has the patient had a colonoscopy / FIT/ Cologuard? Not due     If the patient is female:    4. For patients aged 40-74: Has the patient had a mammogram within the past 2 years? Not due    5. For patients aged 21-65: Has the patient had a pap smear? Due     Health Maintenance: reviewed and discussed and ordered per Provider.    Health Maintenance Due   Topic Date Due    Hepatitis B vaccine (1 of 3 - 3-dose series) Never done    HIV screen  Never done    Hepatitis C screen  Never done    DTaP/Tdap/Td vaccine (1 - Tdap) 2005    Cervical cancer screen  02/10/2013    Shingles vaccine (1 of 2) Never done    A1C test (Diabetic or Prediabetic)  2020    COVID-19 Vaccine ( season) 2023        -Judy Humphreys LPN  [facility]  Phone: [phone]  Fax: [phone]

## 2024-04-03 LAB
HCV AB SER IA-ACNC: 0.1 INDEX
HCV AB SERPL QL IA: NEGATIVE
HEPATITIS C COMMENT: NORMAL
HIV 1+2 AB+HIV1 P24 AG SERPL QL IA: NONREACTIVE
HIV 1/2 RESULT COMMENT: NORMAL

## 2024-08-26 DIAGNOSIS — F41.9 ANXIETY: ICD-10-CM

## 2024-08-26 DIAGNOSIS — G89.29 CHRONIC BILATERAL LOW BACK PAIN WITH LEFT-SIDED SCIATICA: ICD-10-CM

## 2024-08-26 DIAGNOSIS — M54.42 CHRONIC BILATERAL LOW BACK PAIN WITH LEFT-SIDED SCIATICA: ICD-10-CM

## 2024-08-26 DIAGNOSIS — I10 ESSENTIAL (PRIMARY) HYPERTENSION: ICD-10-CM

## 2024-08-26 NOTE — TELEPHONE ENCOUNTER
Medication(s) requesting:   Requested Prescriptions     Pending Prescriptions Disp Refills    amLODIPine (NORVASC) 2.5 MG tablet 90 tablet 1     Sig: Take 1 tablet by mouth daily    DULoxetine (CYMBALTA) 60 MG extended release capsule 90 capsule 1     Sig: Take 1 capsule by mouth daily       Last office visit:  04/02/2024  Next office visit DMA: 10/2/2024

## 2024-08-27 RX ORDER — AMLODIPINE BESYLATE 2.5 MG/1
2.5 TABLET ORAL DAILY
Qty: 90 TABLET | Refills: 1 | Status: SHIPPED | OUTPATIENT
Start: 2024-08-27

## 2024-08-27 RX ORDER — DULOXETIN HYDROCHLORIDE 60 MG/1
60 CAPSULE, DELAYED RELEASE ORAL DAILY
Qty: 90 CAPSULE | Refills: 1 | Status: SHIPPED | OUTPATIENT
Start: 2024-08-27

## 2024-10-02 ENCOUNTER — OFFICE VISIT (OUTPATIENT)
Facility: CLINIC | Age: 59
End: 2024-10-02

## 2024-10-02 VITALS
BODY MASS INDEX: 40.64 KG/M2 | HEIGHT: 60 IN | OXYGEN SATURATION: 98 % | TEMPERATURE: 97.3 F | RESPIRATION RATE: 14 BRPM | DIASTOLIC BLOOD PRESSURE: 84 MMHG | SYSTOLIC BLOOD PRESSURE: 138 MMHG | WEIGHT: 207 LBS | HEART RATE: 89 BPM

## 2024-10-02 DIAGNOSIS — Z85.3 HISTORY OF BREAST CANCER: Primary | ICD-10-CM

## 2024-10-02 DIAGNOSIS — Z90.13 HISTORY OF BILATERAL MASTECTOMY: ICD-10-CM

## 2024-10-02 DIAGNOSIS — E21.3 HYPERPARATHYROIDISM (HCC): ICD-10-CM

## 2024-10-02 DIAGNOSIS — I10 ESSENTIAL (PRIMARY) HYPERTENSION: ICD-10-CM

## 2024-10-02 DIAGNOSIS — Z12.39 ENCOUNTER FOR BREAST CANCER SCREENING USING NON-MAMMOGRAM MODALITY: ICD-10-CM

## 2024-10-02 DIAGNOSIS — R31.29 MICROSCOPIC HEMATURIA: ICD-10-CM

## 2024-10-02 RX ORDER — LOSARTAN POTASSIUM 25 MG/1
25 TABLET ORAL DAILY
Qty: 90 TABLET | Refills: 1 | Status: SHIPPED | OUTPATIENT
Start: 2024-10-02

## 2024-10-02 ASSESSMENT — ENCOUNTER SYMPTOMS
DIARRHEA: 0
SHORTNESS OF BREATH: 0
EYE DISCHARGE: 0
FACIAL SWELLING: 0
VOMITING: 0
COLOR CHANGE: 0
ABDOMINAL PAIN: 0
CONSTIPATION: 0
EYE PAIN: 0
BACK PAIN: 0
EYE ITCHING: 0
EYE REDNESS: 0

## 2024-10-02 NOTE — PROGRESS NOTES
Paz Rosario is a 58 y.o. year old female who presents today for   Chief Complaint   Patient presents with    Hypertension     6 month f/u          \"Have you been to the ER, urgent care clinic since your last visit?  Hospitalized since your last visit?\"   NO     “Have you seen or consulted any other health care providers outside our system since your last visit?”   NO      “Have you had a pap smear?”    YES - Where: Patient has appointment scheduled for November.  Nurse/CMA to request most recent records if not in the chart    Date of last Cervical Cancer screen (HPV or PAP): 2/10/2012             - RAMANA Guzman  [facility]  Phone: [phone]  Fax: [phone]  
Year: Not on file     Unstable Housing in the Last Year: No        Current Outpatient Medications   Medication Sig Dispense Refill    losartan (COZAAR) 25 MG tablet Take 1 tablet by mouth daily 90 tablet 1    DULoxetine (CYMBALTA) 60 MG extended release capsule Take 1 capsule by mouth daily 90 capsule 1    Cholecalciferol (VITAMIN D) 50 MCG (2000 UT) CAPS capsule Take by mouth      ibuprofen (ADVIL;MOTRIN) 800 MG tablet 1 tablet      fluticasone (FLONASE) 50 MCG/ACT nasal spray by Nasal route      METHOCARBAMOL PO Take by mouth      loratadine (CLARITIN) 10 MG tablet Take 1 tablet by mouth daily       No current facility-administered medications for this visit.        ROS   Review of Systems   Constitutional:  Negative for activity change and appetite change.   HENT:  Negative for congestion, drooling, ear discharge, ear pain and facial swelling.    Eyes:  Negative for pain, discharge, redness and itching.   Respiratory:  Negative for shortness of breath.    Cardiovascular:  Negative for leg swelling.   Gastrointestinal:  Negative for abdominal pain, constipation, diarrhea and vomiting.   Genitourinary:  Negative for difficulty urinating, frequency, hematuria and urgency.   Musculoskeletal:  Negative for arthralgias, back pain, myalgias and neck pain.   Skin:  Negative for color change.   Neurological:  Negative for dizziness, seizures, numbness and headaches.   Psychiatric/Behavioral:  Negative for agitation, behavioral problems, decreased concentration, sleep disturbance and suicidal ideas.              Objective:  Vitals:    10/02/24 0749 10/02/24 0855   BP: (!) 146/83 138/84   Site: Left Upper Arm    Position: Sitting    Cuff Size: Large Adult    Pulse: 89    Resp: 14    Temp: 97.3 °F (36.3 °C)    TempSrc: Temporal    SpO2: 98%    Weight: 93.9 kg (207 lb)    Height: 1.524 m (5')          Physical Exam  Vitals reviewed.   Constitutional:       Appearance: She is obese.   HENT:      Head: Normocephalic.      Nose:

## 2024-10-17 ENCOUNTER — OFFICE VISIT (OUTPATIENT)
Facility: CLINIC | Age: 59
End: 2024-10-17

## 2024-10-17 ENCOUNTER — HOSPITAL ENCOUNTER (OUTPATIENT)
Facility: HOSPITAL | Age: 59
Setting detail: SPECIMEN
Discharge: HOME | End: 2024-10-20
Payer: OTHER GOVERNMENT

## 2024-10-17 VITALS — HEART RATE: 83 BPM | DIASTOLIC BLOOD PRESSURE: 88 MMHG | OXYGEN SATURATION: 97 % | SYSTOLIC BLOOD PRESSURE: 147 MMHG

## 2024-10-17 DIAGNOSIS — Z01.30 BLOOD PRESSURE CHECK: Primary | ICD-10-CM

## 2024-10-17 DIAGNOSIS — R31.29 MICROSCOPIC HEMATURIA: ICD-10-CM

## 2024-10-17 LAB
APPEARANCE UR: CLEAR
BACTERIA URNS QL MICRO: ABNORMAL /HPF
BILIRUB UR QL: NEGATIVE
COLOR UR: YELLOW
EPITH CASTS URNS QL MICRO: ABNORMAL /LPF (ref 0–5)
GLUCOSE UR STRIP.AUTO-MCNC: NEGATIVE MG/DL
HGB UR QL STRIP: ABNORMAL
KETONES UR QL STRIP.AUTO: NEGATIVE MG/DL
LEUKOCYTE ESTERASE UR QL STRIP.AUTO: NEGATIVE
NITRITE UR QL STRIP.AUTO: NEGATIVE
PH UR STRIP: 6 (ref 5–8)
PROT UR STRIP-MCNC: NEGATIVE MG/DL
RBC #/AREA URNS HPF: ABNORMAL /HPF (ref 0–5)
SP GR UR REFRACTOMETRY: 1.01 (ref 1–1.03)
UROBILINOGEN UR QL STRIP.AUTO: 0.2 EU/DL (ref 0.2–1)
WBC URNS QL MICRO: NEGATIVE /HPF (ref 0–5)

## 2024-10-17 PROCEDURE — 81001 URINALYSIS AUTO W/SCOPE: CPT

## 2024-10-17 NOTE — PROGRESS NOTES
Paz Rosario is being seen today for a Blood Pressure Recheck.     Paz Rosario was seen 10/2/2024 and her Blood Pressure was:   BP Readings from Last 1 Encounters:   10/17/24 (!) 147/88     Dr. Cabrera made aware of patient's BP.         Dulce Maria Terrazas

## 2024-11-01 DIAGNOSIS — R31.29 MICROSCOPIC HEMATURIA: Primary | ICD-10-CM

## 2024-12-21 DIAGNOSIS — M54.42 CHRONIC BILATERAL LOW BACK PAIN WITH LEFT-SIDED SCIATICA: ICD-10-CM

## 2024-12-21 DIAGNOSIS — F41.9 ANXIETY: ICD-10-CM

## 2024-12-21 DIAGNOSIS — G89.29 CHRONIC BILATERAL LOW BACK PAIN WITH LEFT-SIDED SCIATICA: ICD-10-CM

## 2024-12-23 RX ORDER — DULOXETIN HYDROCHLORIDE 60 MG/1
60 CAPSULE, DELAYED RELEASE ORAL DAILY
Qty: 90 CAPSULE | Refills: 1 | Status: SHIPPED | OUTPATIENT
Start: 2024-12-23

## 2024-12-23 NOTE — TELEPHONE ENCOUNTER
Medication(s) requesting:   Requested Prescriptions     Pending Prescriptions Disp Refills    DULoxetine (CYMBALTA) 60 MG extended release capsule 90 capsule 1     Sig: Take 1 capsule by mouth daily       Last office visit:  10.2.24  Next office visit DMA: 4/2/2025

## 2024-12-24 DIAGNOSIS — I10 ESSENTIAL (PRIMARY) HYPERTENSION: ICD-10-CM

## 2024-12-24 RX ORDER — LOSARTAN POTASSIUM 25 MG/1
25 TABLET ORAL DAILY
Qty: 90 TABLET | Refills: 1 | Status: SHIPPED | OUTPATIENT
Start: 2024-12-24

## 2024-12-26 DIAGNOSIS — I10 ESSENTIAL (PRIMARY) HYPERTENSION: Primary | ICD-10-CM

## 2024-12-26 RX ORDER — AMLODIPINE BESYLATE 2.5 MG/1
2.5 TABLET ORAL DAILY
Qty: 90 TABLET | Refills: 1 | Status: SHIPPED | OUTPATIENT
Start: 2024-12-26

## 2025-03-19 DIAGNOSIS — G89.29 CHRONIC BILATERAL LOW BACK PAIN WITH LEFT-SIDED SCIATICA: ICD-10-CM

## 2025-03-19 DIAGNOSIS — I10 ESSENTIAL (PRIMARY) HYPERTENSION: ICD-10-CM

## 2025-03-19 DIAGNOSIS — M54.42 CHRONIC BILATERAL LOW BACK PAIN WITH LEFT-SIDED SCIATICA: ICD-10-CM

## 2025-03-19 DIAGNOSIS — F41.9 ANXIETY: ICD-10-CM

## 2025-03-20 RX ORDER — AMLODIPINE BESYLATE 2.5 MG/1
2.5 TABLET ORAL DAILY
Qty: 90 TABLET | Refills: 1 | Status: SHIPPED | OUTPATIENT
Start: 2025-03-20

## 2025-03-20 RX ORDER — DULOXETIN HYDROCHLORIDE 60 MG/1
60 CAPSULE, DELAYED RELEASE ORAL DAILY
Qty: 90 CAPSULE | Refills: 1 | Status: SHIPPED | OUTPATIENT
Start: 2025-03-20

## 2025-03-20 NOTE — TELEPHONE ENCOUNTER
Medication(s) requesting:   Requested Prescriptions     Pending Prescriptions Disp Refills    DULoxetine (CYMBALTA) 60 MG extended release capsule 90 capsule 1     Sig: Take 1 capsule by mouth daily       Last office visit:  10/02/2024  Next office visit DMA: 4/2/2025

## 2025-03-20 NOTE — TELEPHONE ENCOUNTER
Medication(s) requesting:   Requested Prescriptions     Pending Prescriptions Disp Refills    amLODIPine (NORVASC) 2.5 MG tablet 90 tablet 1     Sig: Take 1 tablet by mouth daily       Last office visit:  10/02/2024  Next office visit DMA: 3/19/2025

## 2025-04-02 ENCOUNTER — HOSPITAL ENCOUNTER (OUTPATIENT)
Facility: HOSPITAL | Age: 60
Setting detail: SPECIMEN
Discharge: HOME | End: 2025-04-05
Payer: OTHER GOVERNMENT

## 2025-04-02 ENCOUNTER — OFFICE VISIT (OUTPATIENT)
Facility: CLINIC | Age: 60
End: 2025-04-02
Payer: OTHER GOVERNMENT

## 2025-04-02 VITALS
WEIGHT: 210 LBS | TEMPERATURE: 97.2 F | HEIGHT: 60 IN | HEART RATE: 87 BPM | SYSTOLIC BLOOD PRESSURE: 145 MMHG | DIASTOLIC BLOOD PRESSURE: 82 MMHG | BODY MASS INDEX: 41.23 KG/M2 | RESPIRATION RATE: 14 BRPM | OXYGEN SATURATION: 94 %

## 2025-04-02 DIAGNOSIS — I10 ESSENTIAL (PRIMARY) HYPERTENSION: ICD-10-CM

## 2025-04-02 DIAGNOSIS — G89.29 CHRONIC BILATERAL LOW BACK PAIN WITH LEFT-SIDED SCIATICA: ICD-10-CM

## 2025-04-02 DIAGNOSIS — R73.03 PREDIABETES: Primary | ICD-10-CM

## 2025-04-02 DIAGNOSIS — R73.03 PREDIABETES: ICD-10-CM

## 2025-04-02 DIAGNOSIS — F41.9 ANXIETY: ICD-10-CM

## 2025-04-02 DIAGNOSIS — M54.42 CHRONIC BILATERAL LOW BACK PAIN WITH LEFT-SIDED SCIATICA: ICD-10-CM

## 2025-04-02 LAB
ALBUMIN SERPL-MCNC: 3.7 G/DL (ref 3.4–5)
ALBUMIN/GLOB SERPL: 0.9 (ref 0.8–1.7)
ALP SERPL-CCNC: 94 U/L (ref 45–117)
ALT SERPL-CCNC: 45 U/L (ref 13–56)
ANION GAP SERPL CALC-SCNC: 7 MMOL/L (ref 3–18)
APPEARANCE UR: CLEAR
AST SERPL-CCNC: 27 U/L (ref 10–38)
BACTERIA URNS QL MICRO: ABNORMAL /HPF
BILIRUB SERPL-MCNC: 0.5 MG/DL (ref 0.2–1)
BILIRUB UR QL: NEGATIVE
BUN SERPL-MCNC: 13 MG/DL (ref 7–18)
BUN/CREAT SERPL: 17 (ref 12–20)
CALCIUM SERPL-MCNC: 9.3 MG/DL (ref 8.5–10.1)
CAOX CRY URNS QL MICRO: ABNORMAL
CHLORIDE SERPL-SCNC: 106 MMOL/L (ref 100–111)
CHOLEST SERPL-MCNC: 179 MG/DL
CO2 SERPL-SCNC: 27 MMOL/L (ref 21–32)
COLOR UR: YELLOW
CREAT SERPL-MCNC: 0.78 MG/DL (ref 0.6–1.3)
EPITH CASTS URNS QL MICRO: ABNORMAL /LPF (ref 0–5)
ERYTHROCYTE [DISTWIDTH] IN BLOOD BY AUTOMATED COUNT: 14.7 % (ref 11.6–14.5)
EST. AVERAGE GLUCOSE BLD GHB EST-MCNC: 143 MG/DL
GLOBULIN SER CALC-MCNC: 3.9 G/DL (ref 2–4)
GLUCOSE SERPL-MCNC: 121 MG/DL (ref 74–99)
GLUCOSE UR STRIP.AUTO-MCNC: NEGATIVE MG/DL
HBA1C MFR BLD: 6.6 % (ref 4.2–5.6)
HCT VFR BLD AUTO: 42.7 % (ref 35–45)
HDLC SERPL-MCNC: 54 MG/DL (ref 40–60)
HDLC SERPL: 3.3 (ref 0–5)
HGB BLD-MCNC: 13.5 G/DL (ref 12–16)
HGB UR QL STRIP: ABNORMAL
HYALINE CASTS URNS QL MICRO: ABNORMAL /LPF (ref 0–2)
KETONES UR QL STRIP.AUTO: 15 MG/DL
LDLC SERPL CALC-MCNC: 109.6 MG/DL (ref 0–100)
LEUKOCYTE ESTERASE UR QL STRIP.AUTO: NEGATIVE
LIPID PANEL: ABNORMAL
MCH RBC QN AUTO: 29.1 PG (ref 24–34)
MCHC RBC AUTO-ENTMCNC: 31.6 G/DL (ref 31–37)
MCV RBC AUTO: 92 FL (ref 78–100)
MUCOUS THREADS URNS QL MICRO: ABNORMAL /LPF
NITRITE UR QL STRIP.AUTO: NEGATIVE
NRBC # BLD: 0 K/UL (ref 0–0.01)
NRBC BLD-RTO: 0 PER 100 WBC
PH UR STRIP: 6 (ref 5–8)
PLATELET # BLD AUTO: 462 K/UL (ref 135–420)
PMV BLD AUTO: 9.6 FL (ref 9.2–11.8)
POTASSIUM SERPL-SCNC: 3.5 MMOL/L (ref 3.5–5.5)
PROT SERPL-MCNC: 7.6 G/DL (ref 6.4–8.2)
PROT UR STRIP-MCNC: 30 MG/DL
RBC # BLD AUTO: 4.64 M/UL (ref 4.2–5.3)
RBC #/AREA URNS HPF: ABNORMAL /HPF (ref 0–5)
SODIUM SERPL-SCNC: 140 MMOL/L (ref 136–145)
SP GR UR REFRACTOMETRY: 1.02 (ref 1–1.03)
TRIGL SERPL-MCNC: 77 MG/DL
TSH SERPL DL<=0.05 MIU/L-ACNC: 1.59 UIU/ML (ref 0.36–3.74)
UROBILINOGEN UR QL STRIP.AUTO: 1 EU/DL (ref 0.2–1)
VLDLC SERPL CALC-MCNC: 15.4 MG/DL
WBC # BLD AUTO: 7.3 K/UL (ref 4.6–13.2)
WBC URNS QL MICRO: ABNORMAL /HPF (ref 0–5)

## 2025-04-02 PROCEDURE — 3077F SYST BP >= 140 MM HG: CPT | Performed by: STUDENT IN AN ORGANIZED HEALTH CARE EDUCATION/TRAINING PROGRAM

## 2025-04-02 PROCEDURE — 36415 COLL VENOUS BLD VENIPUNCTURE: CPT

## 2025-04-02 PROCEDURE — 83036 HEMOGLOBIN GLYCOSYLATED A1C: CPT

## 2025-04-02 PROCEDURE — 3079F DIAST BP 80-89 MM HG: CPT | Performed by: STUDENT IN AN ORGANIZED HEALTH CARE EDUCATION/TRAINING PROGRAM

## 2025-04-02 PROCEDURE — 80061 LIPID PANEL: CPT

## 2025-04-02 PROCEDURE — 84443 ASSAY THYROID STIM HORMONE: CPT

## 2025-04-02 PROCEDURE — 80053 COMPREHEN METABOLIC PANEL: CPT

## 2025-04-02 PROCEDURE — 81001 URINALYSIS AUTO W/SCOPE: CPT

## 2025-04-02 PROCEDURE — 85027 COMPLETE CBC AUTOMATED: CPT

## 2025-04-02 PROCEDURE — 99214 OFFICE O/P EST MOD 30 MIN: CPT | Performed by: STUDENT IN AN ORGANIZED HEALTH CARE EDUCATION/TRAINING PROGRAM

## 2025-04-02 RX ORDER — AMLODIPINE BESYLATE 2.5 MG/1
2.5 TABLET ORAL DAILY
Qty: 90 TABLET | Refills: 1 | Status: SHIPPED | OUTPATIENT
Start: 2025-04-02

## 2025-04-02 RX ORDER — DULOXETIN HYDROCHLORIDE 60 MG/1
60 CAPSULE, DELAYED RELEASE ORAL DAILY
Qty: 90 CAPSULE | Refills: 1 | Status: SHIPPED | OUTPATIENT
Start: 2025-04-02

## 2025-04-02 SDOH — ECONOMIC STABILITY: FOOD INSECURITY: WITHIN THE PAST 12 MONTHS, THE FOOD YOU BOUGHT JUST DIDN'T LAST AND YOU DIDN'T HAVE MONEY TO GET MORE.: NEVER TRUE

## 2025-04-02 SDOH — ECONOMIC STABILITY: FOOD INSECURITY: WITHIN THE PAST 12 MONTHS, YOU WORRIED THAT YOUR FOOD WOULD RUN OUT BEFORE YOU GOT MONEY TO BUY MORE.: NEVER TRUE

## 2025-04-02 ASSESSMENT — ENCOUNTER SYMPTOMS
BACK PAIN: 0
EYE DISCHARGE: 0
DIARRHEA: 0
ABDOMINAL PAIN: 0
FACIAL SWELLING: 0
EYE ITCHING: 0
CONSTIPATION: 0
SHORTNESS OF BREATH: 0
COLOR CHANGE: 0
VOMITING: 0
EYE PAIN: 0
EYE REDNESS: 0

## 2025-04-02 ASSESSMENT — PATIENT HEALTH QUESTIONNAIRE - PHQ9
SUM OF ALL RESPONSES TO PHQ QUESTIONS 1-9: 0
SUM OF ALL RESPONSES TO PHQ QUESTIONS 1-9: 0
2. FEELING DOWN, DEPRESSED OR HOPELESS: NOT AT ALL
SUM OF ALL RESPONSES TO PHQ QUESTIONS 1-9: 0
SUM OF ALL RESPONSES TO PHQ QUESTIONS 1-9: 0
1. LITTLE INTEREST OR PLEASURE IN DOING THINGS: NOT AT ALL

## 2025-04-02 NOTE — PROGRESS NOTES
Paz Rosario is a 59 y.o.  female and presents with    Chief Complaint   Patient presents with    6 Month Follow-Up           Subjective:    F/up w/ endocrinologist - Dr. Calles for hyperparathyroidism. Vitamin D got increased.  Supposed to take 92378 mg of calcium a day. Had parathyroid surgery in Sept 2023. Will have repeat the DEXA, and new CT of parathyroid in July. Continues to see the PTH still slightly elvated     Was being f/up by [facility].  Hx of implant ruptured from her breast 2021. Was supposed to have a breast exam, but she does not think the hospital is serving retirees anymore. Last breast exam was in 2022.  Has referral for breast cancer surgeon but has not seen specialist just yet.     Hypertension follow up:  Taking medications as prescribed: Yes  Checking BP at home: NO - has monitor but has not been using this.  Symptoms: none  Exercise: no     Urology has not contacted pt. She was told in the past she was going to have hematuria forever, but not sure why. Has not been told about kidney stones.     sates pt snores. She feels tired during the day. She feels like this has been going through the day. If she has a good night     Patient Active Problem List   Diagnosis    Migraines    Hyperparathyroidism    Allergic rhinitis    DCIS (ductal carcinoma in situ) of breast    Microscopic hematuria    GERD (gastroesophageal reflux disease)    Chronic bilateral low back pain with bilateral sciatica      Past Medical History:   Diagnosis Date    Allergic rhinitis     Anxiety     Contact dermatitis and other eczema, due to unspecified cause     DCIS (ductal carcinoma in situ) of breast 12/06    Depression     GERD (gastroesophageal reflux disease)     Hematuria 12/2013    saw Dr. Lei    Hyperparathyroidism     Hypertension     Lung nodule     Microscopic hematuria     Migraines     Prediabetes     Recurrent UTI 12/2013    saw Dr. Lei, macrodantin per notes as needed    TIA

## 2025-04-02 NOTE — PROGRESS NOTES
Paz Rosario is a 59 y.o. year old female who presents today for   Chief Complaint   Patient presents with    6 Month Follow-Up        \"Have you been to the ER, urgent care clinic since your last visit?  Hospitalized since your last visit?\"   NO     “Have you seen or consulted any other health care providers outside our system since your last visit?”   NO      “Have you had a pap smear?”    NO    Date of last Cervical Cancer screen (HPV or PAP): 2/10/2012             - RAMANA Guzman [facility]  Phone: [phone]  Fax: [phone]

## 2025-04-19 ENCOUNTER — RESULTS FOLLOW-UP (OUTPATIENT)
Facility: CLINIC | Age: 60
End: 2025-04-19

## 2025-06-16 DIAGNOSIS — F41.9 ANXIETY: ICD-10-CM

## 2025-06-16 DIAGNOSIS — M54.42 CHRONIC BILATERAL LOW BACK PAIN WITH LEFT-SIDED SCIATICA: ICD-10-CM

## 2025-06-16 DIAGNOSIS — I10 ESSENTIAL (PRIMARY) HYPERTENSION: ICD-10-CM

## 2025-06-16 DIAGNOSIS — G89.29 CHRONIC BILATERAL LOW BACK PAIN WITH LEFT-SIDED SCIATICA: ICD-10-CM

## 2025-06-16 RX ORDER — AMLODIPINE BESYLATE 2.5 MG/1
2.5 TABLET ORAL DAILY
Qty: 90 TABLET | Refills: 1 | Status: SHIPPED | OUTPATIENT
Start: 2025-06-16

## 2025-06-16 RX ORDER — DULOXETIN HYDROCHLORIDE 60 MG/1
60 CAPSULE, DELAYED RELEASE ORAL DAILY
Qty: 90 CAPSULE | Refills: 1 | Status: SHIPPED | OUTPATIENT
Start: 2025-06-16

## 2025-06-16 NOTE — TELEPHONE ENCOUNTER
Medication(s) requesting:   Requested Prescriptions     Pending Prescriptions Disp Refills    DULoxetine (CYMBALTA) 60 MG extended release capsule 90 capsule 1     Sig: Take 1 capsule by mouth daily    amLODIPine (NORVASC) 2.5 MG tablet 90 tablet 1     Sig: Take 1 tablet by mouth daily       Last office visit:  04/02/2025  Next office visit DMA: 7/3/2025

## 2025-07-03 ENCOUNTER — OFFICE VISIT (OUTPATIENT)
Facility: CLINIC | Age: 60
End: 2025-07-03
Payer: OTHER GOVERNMENT

## 2025-07-03 VITALS
RESPIRATION RATE: 16 BRPM | SYSTOLIC BLOOD PRESSURE: 144 MMHG | TEMPERATURE: 97.3 F | HEIGHT: 60 IN | OXYGEN SATURATION: 98 % | WEIGHT: 204.6 LBS | BODY MASS INDEX: 40.17 KG/M2 | DIASTOLIC BLOOD PRESSURE: 85 MMHG | HEART RATE: 86 BPM

## 2025-07-03 DIAGNOSIS — I10 ESSENTIAL (PRIMARY) HYPERTENSION: ICD-10-CM

## 2025-07-03 DIAGNOSIS — R73.03 PREDIABETES: Primary | ICD-10-CM

## 2025-07-03 PROCEDURE — 3077F SYST BP >= 140 MM HG: CPT | Performed by: STUDENT IN AN ORGANIZED HEALTH CARE EDUCATION/TRAINING PROGRAM

## 2025-07-03 PROCEDURE — 3079F DIAST BP 80-89 MM HG: CPT | Performed by: STUDENT IN AN ORGANIZED HEALTH CARE EDUCATION/TRAINING PROGRAM

## 2025-07-03 PROCEDURE — 99214 OFFICE O/P EST MOD 30 MIN: CPT | Performed by: STUDENT IN AN ORGANIZED HEALTH CARE EDUCATION/TRAINING PROGRAM

## 2025-07-03 NOTE — PROGRESS NOTES
Paz Rosario is a 59 y.o. year old female who presents today for   Chief Complaint   Patient presents with    Hypertension     16 day follow up        \"Have you been to the ER, urgent care clinic since your last visit?  Hospitalized since your last visit?\"   NO     “Have you seen or consulted any other health care providers outside our system since your last visit?”   NO             - RAMANA Guzman  [facility]  Phone: [phone]  Fax: [phone]

## 2025-07-03 NOTE — PROGRESS NOTES
Paz Rosario is a 59 y.o.  female and presents with    Chief Complaint   Patient presents with    Hypertension     16 day follow up           Subjective:    Hypertension follow up:  Taking medications as prescribed: Yes  Checking BP at home: NO - has monitor but has not been using this.  Symptoms: none  Exercise: no    Patient Active Problem List   Diagnosis    Migraines    Hyperparathyroidism    Allergic rhinitis    DCIS (ductal carcinoma in situ) of breast    Microscopic hematuria    GERD (gastroesophageal reflux disease)    Chronic bilateral low back pain with bilateral sciatica      Past Medical History:   Diagnosis Date    Allergic rhinitis     Anxiety     Contact dermatitis and other eczema, due to unspecified cause     DCIS (ductal carcinoma in situ) of breast 12/06    Depression     GERD (gastroesophageal reflux disease)     Hematuria 12/2013    saw Dr. Lei    Hyperparathyroidism     Hypertension     Lung nodule     Microscopic hematuria     Migraines     Prediabetes     Recurrent UTI 12/2013    saw Dr. Lei, macrodantin per notes as needed    TIA (transient ischemic attack) 2012      Past Surgical History:   Procedure Laterality Date    BREAST RECONSTRUCTION  1/11    Bilateral    MASTECTOMY Bilateral 03/2009    Bilateral    MYOMECTOMY 1-4,W/TOT 250GMS/<,ABD APPRCH  8/05      Family History   Problem Relation Age of Onset    Diabetes Maternal Grandmother     Kidney Disease Father     Diabetes Mother      Social History     Socioeconomic History    Marital status:      Spouse name: Not on file    Number of children: Not on file    Years of education: Not on file    Highest education level: Not on file   Occupational History    Not on file   Tobacco Use    Smoking status: Never    Smokeless tobacco: Never   Substance and Sexual Activity    Alcohol use: Not Currently    Drug use: No    Sexual activity: Yes     Partners: Male     Birth control/protection: Condom   Other Topics Concern

## 2025-07-17 ENCOUNTER — OFFICE VISIT (OUTPATIENT)
Facility: CLINIC | Age: 60
End: 2025-07-17

## 2025-07-17 VITALS — SYSTOLIC BLOOD PRESSURE: 156 MMHG | DIASTOLIC BLOOD PRESSURE: 84 MMHG

## 2025-07-17 DIAGNOSIS — I10 ESSENTIAL (PRIMARY) HYPERTENSION: Primary | ICD-10-CM

## 2025-07-17 NOTE — PROGRESS NOTES
Spoke to pt that second blood pressure in office was normal, offered patient either increasing amlodipine versus exercise, and patient is picking lifestyle modifications.  If pressure is not improved by the next visit then will increase amlodipine.  Patient verbalized understanding.

## 2025-07-17 NOTE — PROGRESS NOTES
Paz Rosario is a 59 y.o. year old female who presents today for   Chief Complaint   Patient presents with    Hypertension       \"Have you been to the ER, urgent care clinic since your last visit?  Hospitalized since your last visit?\"    no    “Have you seen or consulted any other health care providers outside our system since your last visit?”    no          Click Here for Release of Records Request    - Joy Cullipher, LPN  [facility]  Phone: [phone]  Fax: [phone]